# Patient Record
Sex: FEMALE | Race: WHITE | NOT HISPANIC OR LATINO
[De-identification: names, ages, dates, MRNs, and addresses within clinical notes are randomized per-mention and may not be internally consistent; named-entity substitution may affect disease eponyms.]

---

## 2022-07-05 ENCOUNTER — LABORATORY RESULT (OUTPATIENT)
Age: 32
End: 2022-07-05

## 2022-07-05 ENCOUNTER — RESULT CHARGE (OUTPATIENT)
Age: 32
End: 2022-07-05

## 2022-07-05 ENCOUNTER — APPOINTMENT (OUTPATIENT)
Dept: OBGYN | Facility: CLINIC | Age: 32
End: 2022-07-05

## 2022-07-05 ENCOUNTER — OUTPATIENT (OUTPATIENT)
Dept: OUTPATIENT SERVICES | Facility: HOSPITAL | Age: 32
LOS: 1 days | End: 2022-07-05
Payer: SELF-PAY

## 2022-07-05 VITALS — SYSTOLIC BLOOD PRESSURE: 110 MMHG | WEIGHT: 102 LBS | DIASTOLIC BLOOD PRESSURE: 60 MMHG

## 2022-07-05 DIAGNOSIS — N76.0 ACUTE VAGINITIS: ICD-10-CM

## 2022-07-05 DIAGNOSIS — Z82.49 FAMILY HISTORY OF ISCHEMIC HEART DISEASE AND OTHER DISEASES OF THE CIRCULATORY SYSTEM: ICD-10-CM

## 2022-07-05 LAB
ALBUMIN SERPL ELPH-MCNC: 4.4 G/DL — SIGNIFICANT CHANGE UP (ref 3.3–5)
ALP SERPL-CCNC: 49 U/L — SIGNIFICANT CHANGE UP (ref 40–120)
ALT FLD-CCNC: 6 U/L — LOW (ref 10–45)
ANION GAP SERPL CALC-SCNC: 12 MMOL/L — SIGNIFICANT CHANGE UP (ref 5–17)
AST SERPL-CCNC: 13 U/L — SIGNIFICANT CHANGE UP (ref 10–40)
BILIRUB SERPL-MCNC: 0.5 MG/DL — SIGNIFICANT CHANGE UP (ref 0.2–1.2)
BUN SERPL-MCNC: 8 MG/DL — SIGNIFICANT CHANGE UP (ref 7–23)
CALCIUM SERPL-MCNC: 9.5 MG/DL — SIGNIFICANT CHANGE UP (ref 8.4–10.5)
CHLORIDE SERPL-SCNC: 103 MMOL/L — SIGNIFICANT CHANGE UP (ref 96–108)
CO2 SERPL-SCNC: 22 MMOL/L — SIGNIFICANT CHANGE UP (ref 22–31)
CREAT SERPL-MCNC: 0.5 MG/DL — SIGNIFICANT CHANGE UP (ref 0.5–1.3)
EGFR: 126 ML/MIN/1.73M2 — SIGNIFICANT CHANGE UP
GLUCOSE SERPL-MCNC: 85 MG/DL — SIGNIFICANT CHANGE UP (ref 70–99)
HCG UR QL: POSITIVE
HCT VFR BLD CALC: 36.7 % — SIGNIFICANT CHANGE UP (ref 34.5–45)
HGB BLD-MCNC: 12.5 G/DL — SIGNIFICANT CHANGE UP (ref 11.5–15.5)
MCHC RBC-ENTMCNC: 31.6 PG — SIGNIFICANT CHANGE UP (ref 27–34)
MCHC RBC-ENTMCNC: 34.1 GM/DL — SIGNIFICANT CHANGE UP (ref 32–36)
MCV RBC AUTO: 92.7 FL — SIGNIFICANT CHANGE UP (ref 80–100)
PLATELET # BLD AUTO: 267 K/UL — SIGNIFICANT CHANGE UP (ref 150–400)
POTASSIUM SERPL-MCNC: 4.7 MMOL/L — SIGNIFICANT CHANGE UP (ref 3.5–5.3)
POTASSIUM SERPL-SCNC: 4.7 MMOL/L — SIGNIFICANT CHANGE UP (ref 3.5–5.3)
PROT SERPL-MCNC: 7.1 G/DL — SIGNIFICANT CHANGE UP (ref 6–8.3)
RBC # BLD: 3.96 M/UL — SIGNIFICANT CHANGE UP (ref 3.8–5.2)
RBC # FLD: 12.3 % — SIGNIFICANT CHANGE UP (ref 10.3–14.5)
SODIUM SERPL-SCNC: 137 MMOL/L — SIGNIFICANT CHANGE UP (ref 135–145)
TSH SERPL-MCNC: 0.51 UIU/ML — SIGNIFICANT CHANGE UP (ref 0.27–4.2)
WBC # BLD: 9.22 K/UL — SIGNIFICANT CHANGE UP (ref 3.8–10.5)
WBC # FLD AUTO: 9.22 K/UL — SIGNIFICANT CHANGE UP (ref 3.8–10.5)

## 2022-07-05 PROCEDURE — 85027 COMPLETE CBC AUTOMATED: CPT

## 2022-07-05 PROCEDURE — 81025 URINE PREGNANCY TEST: CPT

## 2022-07-05 PROCEDURE — 83020 HEMOGLOBIN ELECTROPHORESIS: CPT

## 2022-07-05 PROCEDURE — 81003 URINALYSIS AUTO W/O SCOPE: CPT

## 2022-07-05 PROCEDURE — 87591 N.GONORRHOEAE DNA AMP PROB: CPT

## 2022-07-05 PROCEDURE — 87086 URINE CULTURE/COLONY COUNT: CPT

## 2022-07-05 PROCEDURE — 87340 HEPATITIS B SURFACE AG IA: CPT

## 2022-07-05 PROCEDURE — 81329 SMN1 GENE DOS/DELETION ALYS: CPT

## 2022-07-05 PROCEDURE — 87491 CHLMYD TRACH DNA AMP PROBE: CPT

## 2022-07-05 PROCEDURE — 99204 OFFICE O/P NEW MOD 45 MIN: CPT

## 2022-07-05 PROCEDURE — 81243 FMR1 GEN ALY DETC ABNL ALLEL: CPT

## 2022-07-05 PROCEDURE — 80053 COMPREHEN METABOLIC PANEL: CPT

## 2022-07-05 PROCEDURE — 83036 HEMOGLOBIN GLYCOSYLATED A1C: CPT

## 2022-07-05 PROCEDURE — 87624 HPV HI-RISK TYP POOLED RSLT: CPT

## 2022-07-05 PROCEDURE — 87389 HIV-1 AG W/HIV-1&-2 AB AG IA: CPT

## 2022-07-05 PROCEDURE — 86762 RUBELLA ANTIBODY: CPT

## 2022-07-05 PROCEDURE — 86765 RUBEOLA ANTIBODY: CPT

## 2022-07-05 PROCEDURE — 83655 ASSAY OF LEAD: CPT

## 2022-07-05 PROCEDURE — 81220 CFTR GENE COM VARIANTS: CPT

## 2022-07-05 PROCEDURE — 84443 ASSAY THYROID STIM HORMONE: CPT

## 2022-07-05 PROCEDURE — 86850 RBC ANTIBODY SCREEN: CPT

## 2022-07-05 PROCEDURE — 86780 TREPONEMA PALLIDUM: CPT

## 2022-07-05 PROCEDURE — 36415 COLL VENOUS BLD VENIPUNCTURE: CPT

## 2022-07-05 PROCEDURE — 86900 BLOOD TYPING SEROLOGIC ABO: CPT

## 2022-07-05 PROCEDURE — 83020 HEMOGLOBIN ELECTROPHORESIS: CPT | Mod: 26

## 2022-07-05 PROCEDURE — G0463: CPT

## 2022-07-05 PROCEDURE — 87522 HEPATITIS C REVRS TRNSCRPJ: CPT

## 2022-07-05 PROCEDURE — 86901 BLOOD TYPING SEROLOGIC RH(D): CPT

## 2022-07-05 RX ORDER — PRENATAL VIT 49/IRON FUM/FOLIC 6.75-0.2MG
TABLET ORAL
Refills: 0 | Status: ACTIVE | COMMUNITY

## 2022-07-05 NOTE — DISCUSSION/SUMMARY
[FreeTextEntry1] : 33yo - new pt here fr confirmation of pregnancy- (9.1wks)- switched to PCAP\par \par - All PNL, PAP/GC/Ct/ Ucx drawn\par - bASA recommended\par - SW met with pt\par - [ ] ultrascreen @12wks - pt to have NIPS drawn same day (offered pt to come back >10 to have drawn as well)\par - SEE PRENATAL RECORD\par \par RTC 4 wks\par PTL precautions reviewed\par Jose Black, PAC

## 2022-07-05 NOTE — PHYSICAL EXAM
[Appropriately responsive] : appropriately responsive [Alert] : alert [No Acute Distress] : no acute distress [No Lymphadenopathy] : no lymphadenopathy [Regular Rate Rhythm] : regular rate rhythm [No Murmurs] : no murmurs [Clear to Auscultation B/L] : clear to auscultation bilaterally [Soft] : soft [Non-tender] : non-tender [Non-distended] : non-distended [No HSM] : No HSM [No Lesions] : no lesions [No Mass] : no mass [Oriented x3] : oriented x3 [Examination Of The Breasts] : a normal appearance [No Masses] : no breast masses were palpable [Labia Majora] : normal [Labia Minora] : normal [No Bleeding] : There was no active vaginal bleeding [Normal] : normal [Enlarged ___ wks] : enlarged [unfilled] ~Uweeks [Uterine Adnexae] : normal [Tenderness] : nontender

## 2022-07-05 NOTE — HISTORY OF PRESENT ILLNESS
[FreeTextEntry1] : 33yo  (LLMP 22) presents as new pt to confirm pregnancy.  Planned/ desired.  + pos home tests.  Denies vb/ bleeding.  Regular monthly menses q 28\par \par Obhx:   FTNSVD ([facility]) boy- 6lb 1oz- denies complications\par Gynhx: h/o LEEP (~)- nl paps since.  +CT    Last pap: unsure\par Pmhx: denies\par Shx: lap sherry , rhinoplasty\par Meds: PNV, vit D\par ALL: NKDA\par Sochx: denies tob/etoh/ drugs.  denies dv or abuse\par Famhx: parents- HTN/ HLD\par \par \par Covid - pfizer x2 (aware of booster recommendation)

## 2022-07-06 LAB
A1C WITH ESTIMATED AVERAGE GLUCOSE RESULT: 5.5 % — SIGNIFICANT CHANGE UP (ref 4–5.6)
C TRACH RRNA SPEC QL NAA+PROBE: SIGNIFICANT CHANGE UP
CULTURE RESULTS: SIGNIFICANT CHANGE UP
ESTIMATED AVERAGE GLUCOSE: 111 MG/DL — SIGNIFICANT CHANGE UP (ref 68–114)
HBV SURFACE AG SER-ACNC: SIGNIFICANT CHANGE UP
HCV RNA SPEC NAA+PROBE-LOG IU: SIGNIFICANT CHANGE UP
HCV RNA SPEC NAA+PROBE-LOG IU: SIGNIFICANT CHANGE UP LOGIU/ML
HEMOGLOBIN INTERPRETATION: SIGNIFICANT CHANGE UP
HGB A MFR BLD: 97.4 % — SIGNIFICANT CHANGE UP (ref 95.8–98)
HGB A2 MFR BLD: 2.6 % — SIGNIFICANT CHANGE UP (ref 2–3.2)
HIV 1+2 AB+HIV1 P24 AG SERPL QL IA: SIGNIFICANT CHANGE UP
HPV HIGH+LOW RISK DNA PNL CVX: SIGNIFICANT CHANGE UP
LEAD BLD-MCNC: <1 UG/DL — SIGNIFICANT CHANGE UP (ref 0–4)
MEV IGG SER-ACNC: <5 AU/ML — SIGNIFICANT CHANGE UP
MEV IGG+IGM SER-IMP: NEGATIVE — SIGNIFICANT CHANGE UP
N GONORRHOEA RRNA SPEC QL NAA+PROBE: SIGNIFICANT CHANGE UP
RUBV IGG SER-ACNC: 8.2 INDEX — SIGNIFICANT CHANGE UP
RUBV IGG SER-IMP: POSITIVE — SIGNIFICANT CHANGE UP
SPECIMEN SOURCE: SIGNIFICANT CHANGE UP
SPECIMEN SOURCE: SIGNIFICANT CHANGE UP
T PALLIDUM AB TITR SER: NEGATIVE — SIGNIFICANT CHANGE UP

## 2022-07-08 LAB — CYTOLOGY SPEC DOC CYTO: SIGNIFICANT CHANGE UP

## 2022-07-11 DIAGNOSIS — Z34.90 ENCOUNTER FOR SUPERVISION OF NORMAL PREGNANCY, UNSPECIFIED, UNSPECIFIED TRIMESTER: ICD-10-CM

## 2022-07-11 LAB — SMA INTERPRETATION: SIGNIFICANT CHANGE UP

## 2022-07-12 LAB — FRAGILE X PROTEIN (FMRP) PNL BLD: SIGNIFICANT CHANGE UP

## 2022-07-14 ENCOUNTER — NON-APPOINTMENT (OUTPATIENT)
Age: 32
End: 2022-07-14

## 2022-07-14 LAB — CYSTIC FIBROSIS EXPANDED PANEL: SIGNIFICANT CHANGE UP

## 2022-07-20 ENCOUNTER — APPOINTMENT (OUTPATIENT)
Dept: OBGYN | Facility: CLINIC | Age: 32
End: 2022-07-20

## 2022-07-20 ENCOUNTER — NON-APPOINTMENT (OUTPATIENT)
Age: 32
End: 2022-07-20

## 2022-07-20 ENCOUNTER — LABORATORY RESULT (OUTPATIENT)
Age: 32
End: 2022-07-20

## 2022-07-27 ENCOUNTER — APPOINTMENT (OUTPATIENT)
Dept: ANTEPARTUM | Facility: CLINIC | Age: 32
End: 2022-07-27

## 2022-07-27 ENCOUNTER — NON-APPOINTMENT (OUTPATIENT)
Age: 32
End: 2022-07-27

## 2022-07-27 ENCOUNTER — LABORATORY RESULT (OUTPATIENT)
Age: 32
End: 2022-07-27

## 2022-07-27 ENCOUNTER — ASOB RESULT (OUTPATIENT)
Age: 32
End: 2022-07-27

## 2022-07-27 PROCEDURE — 76801 OB US < 14 WKS SINGLE FETUS: CPT

## 2022-07-27 PROCEDURE — 76813 OB US NUCHAL MEAS 1 GEST: CPT | Mod: 59

## 2022-07-27 PROCEDURE — 36415 COLL VENOUS BLD VENIPUNCTURE: CPT

## 2022-08-07 ENCOUNTER — NON-APPOINTMENT (OUTPATIENT)
Age: 32
End: 2022-08-07

## 2022-08-08 ENCOUNTER — NON-APPOINTMENT (OUTPATIENT)
Age: 32
End: 2022-08-08

## 2022-08-08 DIAGNOSIS — U07.1 COVID-19: ICD-10-CM

## 2022-08-08 RX ORDER — NIRMATRELVIR AND RITONAVIR 300-100 MG
20 X 150 MG & KIT ORAL
Qty: 30 | Refills: 0 | Status: ACTIVE | COMMUNITY
Start: 2022-08-08 | End: 1900-01-01

## 2022-08-10 ENCOUNTER — NON-APPOINTMENT (OUTPATIENT)
Age: 32
End: 2022-08-10

## 2022-08-17 ENCOUNTER — NON-APPOINTMENT (OUTPATIENT)
Age: 32
End: 2022-08-17

## 2022-08-18 ENCOUNTER — TRANSCRIPTION ENCOUNTER (OUTPATIENT)
Age: 32
End: 2022-08-18

## 2022-08-18 ENCOUNTER — OUTPATIENT (OUTPATIENT)
Dept: OUTPATIENT SERVICES | Facility: HOSPITAL | Age: 32
LOS: 1 days | End: 2022-08-18
Payer: SELF-PAY

## 2022-08-18 ENCOUNTER — APPOINTMENT (OUTPATIENT)
Dept: OBGYN | Facility: CLINIC | Age: 32
End: 2022-08-18

## 2022-08-18 VITALS
DIASTOLIC BLOOD PRESSURE: 65 MMHG | SYSTOLIC BLOOD PRESSURE: 110 MMHG | BODY MASS INDEX: 22.04 KG/M2 | WEIGHT: 112.25 LBS | HEIGHT: 60 IN

## 2022-08-18 DIAGNOSIS — Z34.80 ENCOUNTER FOR SUPERVISION OF OTHER NORMAL PREGNANCY, UNSPECIFIED TRIMESTER: ICD-10-CM

## 2022-08-18 PROCEDURE — 99213 OFFICE O/P EST LOW 20 MIN: CPT | Mod: 25

## 2022-08-24 ENCOUNTER — ASOB RESULT (OUTPATIENT)
Age: 32
End: 2022-08-24

## 2022-08-24 ENCOUNTER — LABORATORY RESULT (OUTPATIENT)
Age: 32
End: 2022-08-24

## 2022-08-24 ENCOUNTER — APPOINTMENT (OUTPATIENT)
Dept: ANTEPARTUM | Facility: CLINIC | Age: 32
End: 2022-08-24

## 2022-08-24 DIAGNOSIS — Z34.90 ENCOUNTER FOR SUPERVISION OF NORMAL PREGNANCY, UNSPECIFIED, UNSPECIFIED TRIMESTER: ICD-10-CM

## 2022-08-24 DIAGNOSIS — Z34.92 ENCOUNTER FOR SUPERVISION OF NORMAL PREGNANCY, UNSPECIFIED, SECOND TRIMESTER: ICD-10-CM

## 2022-08-24 PROCEDURE — G0463: CPT

## 2022-08-24 PROCEDURE — 36415 COLL VENOUS BLD VENIPUNCTURE: CPT

## 2022-08-24 PROCEDURE — 81003 URINALYSIS AUTO W/O SCOPE: CPT

## 2022-08-24 PROCEDURE — 76805 OB US >/= 14 WKS SNGL FETUS: CPT

## 2022-09-07 ENCOUNTER — ASOB RESULT (OUTPATIENT)
Age: 32
End: 2022-09-07

## 2022-09-07 ENCOUNTER — NON-APPOINTMENT (OUTPATIENT)
Age: 32
End: 2022-09-07

## 2022-09-07 ENCOUNTER — APPOINTMENT (OUTPATIENT)
Dept: ANTEPARTUM | Facility: CLINIC | Age: 32
End: 2022-09-07

## 2022-09-07 PROCEDURE — 76817 TRANSVAGINAL US OBSTETRIC: CPT

## 2022-09-12 LAB — DEMOGRAPHIC DATA: SIGNIFICANT CHANGE UP

## 2022-09-19 ENCOUNTER — NON-APPOINTMENT (OUTPATIENT)
Age: 32
End: 2022-09-19

## 2022-09-21 ENCOUNTER — LABORATORY RESULT (OUTPATIENT)
Age: 32
End: 2022-09-21

## 2022-09-21 ENCOUNTER — OUTPATIENT (OUTPATIENT)
Dept: OUTPATIENT SERVICES | Facility: HOSPITAL | Age: 32
LOS: 1 days | End: 2022-09-21
Payer: SELF-PAY

## 2022-09-21 ENCOUNTER — APPOINTMENT (OUTPATIENT)
Dept: ANTEPARTUM | Facility: CLINIC | Age: 32
End: 2022-09-21

## 2022-09-21 ENCOUNTER — APPOINTMENT (OUTPATIENT)
Dept: OBGYN | Facility: CLINIC | Age: 32
End: 2022-09-21

## 2022-09-21 ENCOUNTER — ASOB RESULT (OUTPATIENT)
Age: 32
End: 2022-09-21

## 2022-09-21 VITALS — BODY MASS INDEX: 22.66 KG/M2 | SYSTOLIC BLOOD PRESSURE: 110 MMHG | DIASTOLIC BLOOD PRESSURE: 68 MMHG | WEIGHT: 116 LBS

## 2022-09-21 DIAGNOSIS — Z34.80 ENCOUNTER FOR SUPERVISION OF OTHER NORMAL PREGNANCY, UNSPECIFIED TRIMESTER: ICD-10-CM

## 2022-09-21 PROCEDURE — 99213 OFFICE O/P EST LOW 20 MIN: CPT | Mod: 25

## 2022-09-21 PROCEDURE — 76811 OB US DETAILED SNGL FETUS: CPT

## 2022-09-21 PROCEDURE — 76817 TRANSVAGINAL US OBSTETRIC: CPT

## 2022-09-21 PROCEDURE — G0463: CPT

## 2022-09-21 PROCEDURE — 86480 TB TEST CELL IMMUN MEASURE: CPT

## 2022-09-21 PROCEDURE — 81002 URINALYSIS NONAUTO W/O SCOPE: CPT

## 2022-09-21 PROCEDURE — 36415 COLL VENOUS BLD VENIPUNCTURE: CPT

## 2022-09-24 LAB
GAMMA INTERFERON BACKGROUND BLD IA-ACNC: 0.13 IU/ML — SIGNIFICANT CHANGE UP
M TB IFN-G BLD-IMP: NEGATIVE — SIGNIFICANT CHANGE UP
M TB IFN-G CD4+ BCKGRND COR BLD-ACNC: 0.06 IU/ML — SIGNIFICANT CHANGE UP
M TB IFN-G CD4+CD8+ BCKGRND COR BLD-ACNC: 0.04 IU/ML — SIGNIFICANT CHANGE UP
QUANT TB PLUS MITOGEN MINUS NIL: 8.05 IU/ML — SIGNIFICANT CHANGE UP

## 2022-09-26 DIAGNOSIS — Z34.92 ENCOUNTER FOR SUPERVISION OF NORMAL PREGNANCY, UNSPECIFIED, SECOND TRIMESTER: ICD-10-CM

## 2022-10-05 ENCOUNTER — ASOB RESULT (OUTPATIENT)
Age: 32
End: 2022-10-05

## 2022-10-05 ENCOUNTER — APPOINTMENT (OUTPATIENT)
Dept: ANTEPARTUM | Facility: CLINIC | Age: 32
End: 2022-10-05

## 2022-10-05 PROCEDURE — 76817 TRANSVAGINAL US OBSTETRIC: CPT

## 2022-10-05 PROCEDURE — 76816 OB US FOLLOW-UP PER FETUS: CPT

## 2022-10-06 ENCOUNTER — NON-APPOINTMENT (OUTPATIENT)
Age: 32
End: 2022-10-06

## 2022-10-19 ENCOUNTER — APPOINTMENT (OUTPATIENT)
Dept: OBGYN | Facility: CLINIC | Age: 32
End: 2022-10-19

## 2022-10-19 ENCOUNTER — LABORATORY RESULT (OUTPATIENT)
Age: 32
End: 2022-10-19

## 2022-10-19 ENCOUNTER — OUTPATIENT (OUTPATIENT)
Dept: OUTPATIENT SERVICES | Facility: HOSPITAL | Age: 32
LOS: 1 days | End: 2022-10-19
Payer: SELF-PAY

## 2022-10-19 VITALS — BODY MASS INDEX: 23.63 KG/M2 | SYSTOLIC BLOOD PRESSURE: 106 MMHG | WEIGHT: 121 LBS | DIASTOLIC BLOOD PRESSURE: 60 MMHG

## 2022-10-19 DIAGNOSIS — Z34.90 ENCOUNTER FOR SUPERVISION OF NORMAL PREGNANCY, UNSPECIFIED, UNSPECIFIED TRIMESTER: ICD-10-CM

## 2022-10-19 DIAGNOSIS — Z34.80 ENCOUNTER FOR SUPERVISION OF OTHER NORMAL PREGNANCY, UNSPECIFIED TRIMESTER: ICD-10-CM

## 2022-10-19 LAB — GLUCOSE 1H P MEAL SERPL-MCNC: 98 MG/DL — SIGNIFICANT CHANGE UP (ref 70–134)

## 2022-10-19 PROCEDURE — 36415 COLL VENOUS BLD VENIPUNCTURE: CPT

## 2022-10-19 PROCEDURE — 82950 GLUCOSE TEST: CPT

## 2022-10-19 PROCEDURE — 99213 OFFICE O/P EST LOW 20 MIN: CPT | Mod: 25

## 2022-11-14 ENCOUNTER — NON-APPOINTMENT (OUTPATIENT)
Age: 32
End: 2022-11-14

## 2022-11-15 ENCOUNTER — LABORATORY RESULT (OUTPATIENT)
Age: 32
End: 2022-11-15

## 2022-11-15 ENCOUNTER — OUTPATIENT (OUTPATIENT)
Dept: OUTPATIENT SERVICES | Facility: HOSPITAL | Age: 32
LOS: 1 days | End: 2022-11-15
Payer: MEDICAID

## 2022-11-15 ENCOUNTER — APPOINTMENT (OUTPATIENT)
Dept: OBGYN | Facility: CLINIC | Age: 32
End: 2022-11-15

## 2022-11-15 VITALS — BODY MASS INDEX: 24.22 KG/M2 | SYSTOLIC BLOOD PRESSURE: 110 MMHG | DIASTOLIC BLOOD PRESSURE: 76 MMHG | WEIGHT: 124 LBS

## 2022-11-15 DIAGNOSIS — Z34.80 ENCOUNTER FOR SUPERVISION OF OTHER NORMAL PREGNANCY, UNSPECIFIED TRIMESTER: ICD-10-CM

## 2022-11-15 LAB
HCT VFR BLD CALC: 39.5 % — SIGNIFICANT CHANGE UP (ref 34.5–45)
HGB BLD-MCNC: 12.9 G/DL — SIGNIFICANT CHANGE UP (ref 11.5–15.5)
MCHC RBC-ENTMCNC: 30.6 PG — SIGNIFICANT CHANGE UP (ref 27–34)
MCHC RBC-ENTMCNC: 32.7 GM/DL — SIGNIFICANT CHANGE UP (ref 32–36)
MCV RBC AUTO: 93.8 FL — SIGNIFICANT CHANGE UP (ref 80–100)
PLATELET # BLD AUTO: 211 K/UL — SIGNIFICANT CHANGE UP (ref 150–400)
RBC # BLD: 4.21 M/UL — SIGNIFICANT CHANGE UP (ref 3.8–5.2)
RBC # FLD: 13.2 % — SIGNIFICANT CHANGE UP (ref 10.3–14.5)
WBC # BLD: 8.93 K/UL — SIGNIFICANT CHANGE UP (ref 3.8–10.5)
WBC # FLD AUTO: 8.93 K/UL — SIGNIFICANT CHANGE UP (ref 3.8–10.5)

## 2022-11-15 PROCEDURE — 86780 TREPONEMA PALLIDUM: CPT

## 2022-11-15 PROCEDURE — G0463: CPT

## 2022-11-15 PROCEDURE — 81002 URINALYSIS NONAUTO W/O SCOPE: CPT

## 2022-11-15 PROCEDURE — 99213 OFFICE O/P EST LOW 20 MIN: CPT | Mod: 25

## 2022-11-15 PROCEDURE — 85027 COMPLETE CBC AUTOMATED: CPT

## 2022-11-15 PROCEDURE — 36415 COLL VENOUS BLD VENIPUNCTURE: CPT

## 2022-11-16 ENCOUNTER — ASOB RESULT (OUTPATIENT)
Age: 32
End: 2022-11-16

## 2022-11-16 ENCOUNTER — APPOINTMENT (OUTPATIENT)
Dept: ANTEPARTUM | Facility: CLINIC | Age: 32
End: 2022-11-16

## 2022-11-16 LAB — T PALLIDUM AB TITR SER: NEGATIVE — SIGNIFICANT CHANGE UP

## 2022-11-16 PROCEDURE — 76817 TRANSVAGINAL US OBSTETRIC: CPT

## 2022-11-16 PROCEDURE — 76816 OB US FOLLOW-UP PER FETUS: CPT

## 2022-11-19 DIAGNOSIS — Z34.90 ENCOUNTER FOR SUPERVISION OF NORMAL PREGNANCY, UNSPECIFIED, UNSPECIFIED TRIMESTER: ICD-10-CM

## 2022-11-28 RX ORDER — NIRMATRELVIR AND RITONAVIR 300-100 MG
20 X 150 MG & KIT ORAL
Qty: 30 | Refills: 0 | Status: ACTIVE | COMMUNITY
Start: 2022-11-28 | End: 1900-01-01

## 2022-12-04 ENCOUNTER — INPATIENT (INPATIENT)
Facility: HOSPITAL | Age: 32
LOS: 11 days | Discharge: HOME | End: 2022-12-16
Attending: OBSTETRICS & GYNECOLOGY | Admitting: OBSTETRICS & GYNECOLOGY
Payer: MEDICAID

## 2022-12-04 ENCOUNTER — NON-APPOINTMENT (OUTPATIENT)
Age: 32
End: 2022-12-04

## 2022-12-04 VITALS — OXYGEN SATURATION: 98 % | HEART RATE: 90 BPM

## 2022-12-04 DIAGNOSIS — Z98.890 OTHER SPECIFIED POSTPROCEDURAL STATES: Chronic | ICD-10-CM

## 2022-12-04 DIAGNOSIS — Z3A.00 WEEKS OF GESTATION OF PREGNANCY NOT SPECIFIED: ICD-10-CM

## 2022-12-04 DIAGNOSIS — O26.899 OTHER SPECIFIED PREGNANCY RELATED CONDITIONS, UNSPECIFIED TRIMESTER: ICD-10-CM

## 2022-12-04 DIAGNOSIS — Z34.80 ENCOUNTER FOR SUPERVISION OF OTHER NORMAL PREGNANCY, UNSPECIFIED TRIMESTER: ICD-10-CM

## 2022-12-04 DIAGNOSIS — Z90.49 ACQUIRED ABSENCE OF OTHER SPECIFIED PARTS OF DIGESTIVE TRACT: Chronic | ICD-10-CM

## 2022-12-04 LAB
ALBUMIN SERPL ELPH-MCNC: 3.9 G/DL — SIGNIFICANT CHANGE UP (ref 3.3–5)
ALP SERPL-CCNC: 120 U/L — SIGNIFICANT CHANGE UP (ref 40–120)
ALT FLD-CCNC: 15 U/L — SIGNIFICANT CHANGE UP (ref 10–45)
ANION GAP SERPL CALC-SCNC: 12 MMOL/L — SIGNIFICANT CHANGE UP (ref 5–17)
APPEARANCE UR: CLEAR — SIGNIFICANT CHANGE UP
APTT BLD: 24.8 SEC — LOW (ref 27.5–35.5)
AST SERPL-CCNC: 23 U/L — SIGNIFICANT CHANGE UP (ref 10–40)
BASOPHILS # BLD AUTO: 0.06 K/UL — SIGNIFICANT CHANGE UP (ref 0–0.2)
BASOPHILS NFR BLD AUTO: 0.8 % — SIGNIFICANT CHANGE UP (ref 0–2)
BILIRUB SERPL-MCNC: 0.3 MG/DL — SIGNIFICANT CHANGE UP (ref 0.2–1.2)
BILIRUB UR-MCNC: NEGATIVE — SIGNIFICANT CHANGE UP
BUN SERPL-MCNC: 9 MG/DL — SIGNIFICANT CHANGE UP (ref 7–23)
CALCIUM SERPL-MCNC: 9.2 MG/DL — SIGNIFICANT CHANGE UP (ref 8.4–10.5)
CHLORIDE SERPL-SCNC: 103 MMOL/L — SIGNIFICANT CHANGE UP (ref 96–108)
CO2 SERPL-SCNC: 23 MMOL/L — SIGNIFICANT CHANGE UP (ref 22–31)
COLOR SPEC: SIGNIFICANT CHANGE UP
COVID-19 SPIKE DOMAIN AB INTERP: POSITIVE
COVID-19 SPIKE DOMAIN ANTIBODY RESULT: >250 U/ML — HIGH
CREAT SERPL-MCNC: 0.54 MG/DL — SIGNIFICANT CHANGE UP (ref 0.5–1.3)
DIFF PNL FLD: NEGATIVE — SIGNIFICANT CHANGE UP
EGFR: 124 ML/MIN/1.73M2 — SIGNIFICANT CHANGE UP
EOSINOPHIL # BLD AUTO: 0.07 K/UL — SIGNIFICANT CHANGE UP (ref 0–0.5)
EOSINOPHIL NFR BLD AUTO: 0.9 % — SIGNIFICANT CHANGE UP (ref 0–6)
GLUCOSE SERPL-MCNC: 64 MG/DL — LOW (ref 70–99)
GLUCOSE UR QL: NEGATIVE — SIGNIFICANT CHANGE UP
HCT VFR BLD CALC: 39.5 % — SIGNIFICANT CHANGE UP (ref 34.5–45)
HGB BLD-MCNC: 13.7 G/DL — SIGNIFICANT CHANGE UP (ref 11.5–15.5)
IMM GRANULOCYTES NFR BLD AUTO: 0.7 % — SIGNIFICANT CHANGE UP (ref 0–0.9)
KETONES UR-MCNC: ABNORMAL
LEUKOCYTE ESTERASE UR-ACNC: NEGATIVE — SIGNIFICANT CHANGE UP
LYMPHOCYTES # BLD AUTO: 2.47 K/UL — SIGNIFICANT CHANGE UP (ref 1–3.3)
LYMPHOCYTES # BLD AUTO: 32.5 % — SIGNIFICANT CHANGE UP (ref 13–44)
MAGNESIUM SERPL-MCNC: 6 MG/DL — HIGH (ref 1.6–2.6)
MCHC RBC-ENTMCNC: 31.2 PG — SIGNIFICANT CHANGE UP (ref 27–34)
MCHC RBC-ENTMCNC: 34.7 GM/DL — SIGNIFICANT CHANGE UP (ref 32–36)
MCV RBC AUTO: 90 FL — SIGNIFICANT CHANGE UP (ref 80–100)
MONOCYTES # BLD AUTO: 0.42 K/UL — SIGNIFICANT CHANGE UP (ref 0–0.9)
MONOCYTES NFR BLD AUTO: 5.5 % — SIGNIFICANT CHANGE UP (ref 2–14)
NEUTROPHILS # BLD AUTO: 4.52 K/UL — SIGNIFICANT CHANGE UP (ref 1.8–7.4)
NEUTROPHILS NFR BLD AUTO: 59.6 % — SIGNIFICANT CHANGE UP (ref 43–77)
NITRITE UR-MCNC: NEGATIVE — SIGNIFICANT CHANGE UP
NRBC # BLD: 0 /100 WBCS — SIGNIFICANT CHANGE UP (ref 0–0)
PH UR: 6 — SIGNIFICANT CHANGE UP (ref 5–8)
PLATELET # BLD AUTO: 219 K/UL — SIGNIFICANT CHANGE UP (ref 150–400)
POTASSIUM SERPL-MCNC: 4.1 MMOL/L — SIGNIFICANT CHANGE UP (ref 3.5–5.3)
POTASSIUM SERPL-SCNC: 4.1 MMOL/L — SIGNIFICANT CHANGE UP (ref 3.5–5.3)
PROT SERPL-MCNC: 7.4 G/DL — SIGNIFICANT CHANGE UP (ref 6–8.3)
PROT UR-MCNC: SIGNIFICANT CHANGE UP
RBC # BLD: 4.39 M/UL — SIGNIFICANT CHANGE UP (ref 3.8–5.2)
RBC # FLD: 12 % — SIGNIFICANT CHANGE UP (ref 10.3–14.5)
SARS-COV-2 IGG+IGM SERPL QL IA: >250 U/ML — HIGH
SARS-COV-2 IGG+IGM SERPL QL IA: POSITIVE
SODIUM SERPL-SCNC: 138 MMOL/L — SIGNIFICANT CHANGE UP (ref 135–145)
SP GR SPEC: 1.02 — SIGNIFICANT CHANGE UP (ref 1.01–1.02)
URATE SERPL-MCNC: 6 MG/DL — SIGNIFICANT CHANGE UP (ref 2.5–7)
UROBILINOGEN FLD QL: NEGATIVE — SIGNIFICANT CHANGE UP
WBC # BLD: 7.59 K/UL — SIGNIFICANT CHANGE UP (ref 3.8–10.5)
WBC # FLD AUTO: 7.59 K/UL — SIGNIFICANT CHANGE UP (ref 3.8–10.5)

## 2022-12-04 RX ORDER — AMPICILLIN TRIHYDRATE 250 MG
2 CAPSULE ORAL ONCE
Refills: 0 | Status: COMPLETED | OUTPATIENT
Start: 2022-12-04 | End: 2022-12-04

## 2022-12-04 RX ORDER — FOLIC ACID 0.8 MG
1 TABLET ORAL DAILY
Refills: 0 | Status: DISCONTINUED | OUTPATIENT
Start: 2022-12-04 | End: 2022-12-05

## 2022-12-04 RX ORDER — AMPICILLIN TRIHYDRATE 250 MG
2 CAPSULE ORAL EVERY 6 HOURS
Refills: 0 | Status: DISCONTINUED | OUTPATIENT
Start: 2022-12-04 | End: 2022-12-05

## 2022-12-04 RX ORDER — SODIUM CHLORIDE 9 MG/ML
1000 INJECTION, SOLUTION INTRAVENOUS
Refills: 0 | Status: DISCONTINUED | OUTPATIENT
Start: 2022-12-04 | End: 2022-12-05

## 2022-12-04 RX ORDER — AMOXICILLIN 250 MG/5ML
500 SUSPENSION, RECONSTITUTED, ORAL (ML) ORAL EVERY 8 HOURS
Refills: 0 | Status: DISCONTINUED | OUTPATIENT
Start: 2022-12-04 | End: 2022-12-05

## 2022-12-04 RX ORDER — AZITHROMYCIN 500 MG/1
1000 TABLET, FILM COATED ORAL ONCE
Refills: 0 | Status: COMPLETED | OUTPATIENT
Start: 2022-12-04 | End: 2022-12-04

## 2022-12-04 RX ORDER — MAGNESIUM SULFATE 500 MG/ML
4 VIAL (ML) INJECTION ONCE
Refills: 0 | Status: COMPLETED | OUTPATIENT
Start: 2022-12-04 | End: 2022-12-04

## 2022-12-04 RX ORDER — MAGNESIUM SULFATE 500 MG/ML
2 VIAL (ML) INJECTION
Qty: 40 | Refills: 0 | Status: DISCONTINUED | OUTPATIENT
Start: 2022-12-04 | End: 2022-12-05

## 2022-12-04 RX ORDER — AMPICILLIN TRIHYDRATE 250 MG
CAPSULE ORAL
Refills: 0 | Status: DISCONTINUED | OUTPATIENT
Start: 2022-12-04 | End: 2022-12-05

## 2022-12-04 RX ADMIN — Medication 12 MILLIGRAM(S): at 11:39

## 2022-12-04 RX ADMIN — Medication 300 GRAM(S): at 11:44

## 2022-12-04 RX ADMIN — Medication 50 GM/HR: at 12:28

## 2022-12-04 RX ADMIN — Medication 100 GRAM(S): at 11:58

## 2022-12-04 RX ADMIN — Medication 100 GRAM(S): at 18:15

## 2022-12-04 RX ADMIN — AZITHROMYCIN 1000 MILLIGRAM(S): 500 TABLET, FILM COATED ORAL at 13:45

## 2022-12-04 NOTE — OB RN PATIENT PROFILE - FALL HARM RISK - UNIVERSAL INTERVENTIONS
Bed in lowest position, wheels locked, appropriate side rails in place/Call bell, personal items and telephone in reach/Instruct patient to call for assistance before getting out of bed or chair/Non-slip footwear when patient is out of bed/[place] to call system/Physically safe environment - no spills, clutter or unnecessary equipment/Purposeful Proactive Rounding/Room/bathroom lighting operational, light cord in reach

## 2022-12-04 NOTE — OB PROVIDER H&P - HISTORY OF PRESENT ILLNESS
R3 H&P    Pt is a 31 y/o  at 30w6d presents with leakage of fluid since 05:30AM. Patient states that she woke up and noted leakage of fluid and has had several episodes of leakage of fluid since. She denies any other complaints, no F/C, urinary symptoms, abdominal pain, nausea, vomiting. Of note, she states that she was sick with COVID on Thanksgiving.   Pt denies ctx, VB, FM felt.   Prenatal course uncomplicated (low lying placenta resolved)    OBHx: 2011 - , uncomplicated, 6#1  GynHx: +fibroids (R Lateral 3.1cm x 3.45cm x 3.29 cm, posterior 1.6 x 1.2 x 0.98 cm), hx of LEEP  PMHx: denies  PSHx: LSC sherry, rhinoplasty  Med: Asa, PNV  All: NKDA  SH: denies t/a/d  PsychHx: denies hx of anxiety/depression

## 2022-12-04 NOTE — OB PROVIDER H&P - ASSESSMENT
33 y/o  @30w6d presenting with PPROM@30w6d. PNC otherwise uncomplicated.     #PPROM  - Ampicillin for 48 hours & Azithromycin x1 for latency, Amoxicillin for 5 days following Ampicillin  - BMZ for FLM (-)  - Mg for fetal neuroprotection(-)  - Initial prolonged monitoring, may switch to NST BID after     #Fetal wellbeing  - BMZ/Monitoring as above  - NICU consult  - f/u GBS(-)    #Maternal wellbeing  - Regular diet  - HSQ/SCDs for DVT ppx  - PNV/Iron/Colace/Folic acid  - f/u UA    Yana Jade, PGY-3

## 2022-12-04 NOTE — OB PROVIDER H&P - ATTENDING COMMENTS
OB  Addendum    31 y/o  @30w6d  latency bmtz mg gtt  monitor on L&D till AM  NICU consult  Fetal status reassuring  vtx  admit to AP    Arley Kovacs MD   OB

## 2022-12-04 NOTE — OB RN PATIENT PROFILE - FUNCTIONAL ASSESSMENT - DAILY ACTIVITY 4.
4 = No assist / stand by assistance Island Pedicle Flap-Requiring Vessel Identification Text: The defect edges were debeveled with a #15 scalpel blade.  Given the location of the defect, shape of the defect and the proximity to free margins an island pedicle advancement flap was deemed most appropriate.  Using a sterile surgical marker, an appropriate advancement flap was drawn, based on the axial vessel mentioned above, incorporating the defect, outlining the appropriate donor tissue and placing the expected incisions within the relaxed skin tension lines where possible.    The area thus outlined was incised deep to adipose tissue with a #15 scalpel blade.  The skin margins were undermined to an appropriate distance in all directions around the primary defect and laterally outward around the island pedicle utilizing iris scissors.  There was minimal undermining beneath the pedicle flap.

## 2022-12-04 NOTE — OB PROVIDER H&P - ALERT: PERTINENT HISTORY
Message   Recorded as Task   Date: 05/19/2017 03:52 PM, Created By: Britta Larson   Task Name: 4. Patient Message   Assigned To: SAMARA PULIDO   Regarding Patient: KARLA FREEMAN, Status: Active   Comment:    Britta Larson - 19 May 2017 3:52 PM     TASK CREATED  Patient wanted you to know that the birth control she takes is called STEFFANIE        Signatures   Electronically signed by : SAMARA PULIDO M.D.; May 21 2017 12:11PM CST    
1st Trimester Sonogram/20 Week Level II Sonogram

## 2022-12-04 NOTE — OB PROVIDER H&P - NSHPPHYSICALEXAM_GEN_ALL_CORE
VS  T(C): 36.6 (12-04-22 @ 09:36)  HR: 69 (12-04-22 @ 11:54)  BP: 121/79 (12-04-22 @ 09:36)  RR: 18 (12-04-22 @ 09:36)  SpO2: 100% (12-04-22 @ 11:54)  GA: NAD  Cards: RRR  Pulm: CTAB  EFH: baseline 150,moderate variability, +accels, -decels   [place]: rare ctx  VE: 1/0/-3  TAUS: cephalic, posterior placenta, KYLEE 5.2cm, +fetal tone, +fetal mvt, +practice breathing VS  T(C): 36.6 (12-04-22 @ 09:36)  HR: 69 (12-04-22 @ 11:54)  BP: 121/79 (12-04-22 @ 09:36)  RR: 18 (12-04-22 @ 09:36)  SpO2: 100% (12-04-22 @ 11:54)  GA: NAD  Cards: RRR  Pulm: CTAB  EFH: baseline 150,moderate variability, +accels, -decels   [place]: rare ctx    SSE: +pooling  VE: 1/0/-3  TAUS: cephalic, posterior placenta, KYLEE 5.2cm, +fetal tone, +fetal mvt, +practice breathing

## 2022-12-05 ENCOUNTER — APPOINTMENT (OUTPATIENT)
Dept: OBGYN | Facility: CLINIC | Age: 32
End: 2022-12-05

## 2022-12-05 ENCOUNTER — APPOINTMENT (OUTPATIENT)
Dept: ANTEPARTUM | Facility: CLINIC | Age: 32
End: 2022-12-05

## 2022-12-05 ENCOUNTER — ASOB RESULT (OUTPATIENT)
Age: 32
End: 2022-12-05

## 2022-12-05 LAB
CREAT ?TM UR-MCNC: 85 MG/DL — SIGNIFICANT CHANGE UP
GROUP B BETA STREP DNA (PCR): SIGNIFICANT CHANGE UP
MAGNESIUM SERPL-MCNC: 6.5 MG/DL — HIGH (ref 1.6–2.6)
PROT ?TM UR-MCNC: 14 MG/DL — HIGH (ref 0–12)
PROT/CREAT UR-RTO: 0.2 RATIO — SIGNIFICANT CHANGE UP (ref 0–0.2)
SARS-COV-2 RNA SPEC QL NAA+PROBE: SIGNIFICANT CHANGE UP
SOURCE GROUP B STREP: SIGNIFICANT CHANGE UP
T PALLIDUM AB TITR SER: NEGATIVE — SIGNIFICANT CHANGE UP

## 2022-12-05 PROCEDURE — 99253 IP/OBS CNSLTJ NEW/EST LOW 45: CPT

## 2022-12-05 RX ORDER — AMPICILLIN TRIHYDRATE 250 MG
2 CAPSULE ORAL EVERY 6 HOURS
Refills: 0 | Status: COMPLETED | OUTPATIENT
Start: 2022-12-05 | End: 2022-12-06

## 2022-12-05 RX ORDER — FOLIC ACID 0.8 MG
1 TABLET ORAL DAILY
Refills: 0 | Status: DISCONTINUED | OUTPATIENT
Start: 2022-12-05 | End: 2022-12-12

## 2022-12-05 RX ORDER — HEPARIN SODIUM 5000 [USP'U]/ML
5000 INJECTION INTRAVENOUS; SUBCUTANEOUS EVERY 12 HOURS
Refills: 0 | Status: DISCONTINUED | OUTPATIENT
Start: 2022-12-05 | End: 2022-12-12

## 2022-12-05 RX ORDER — AMOXICILLIN 250 MG/5ML
500 SUSPENSION, RECONSTITUTED, ORAL (ML) ORAL EVERY 8 HOURS
Refills: 0 | Status: COMPLETED | OUTPATIENT
Start: 2022-12-05

## 2022-12-05 RX ADMIN — Medication 12 MILLIGRAM(S): at 11:57

## 2022-12-05 RX ADMIN — Medication 100 GRAM(S): at 11:58

## 2022-12-05 RX ADMIN — HEPARIN SODIUM 5000 UNIT(S): 5000 INJECTION INTRAVENOUS; SUBCUTANEOUS at 22:18

## 2022-12-05 RX ADMIN — Medication 100 GRAM(S): at 00:11

## 2022-12-05 RX ADMIN — Medication 100 GRAM(S): at 05:20

## 2022-12-05 RX ADMIN — Medication 100 GRAM(S): at 18:32

## 2022-12-05 RX ADMIN — Medication 1 TABLET(S): at 11:58

## 2022-12-05 RX ADMIN — HEPARIN SODIUM 5000 UNIT(S): 5000 INJECTION INTRAVENOUS; SUBCUTANEOUS at 11:56

## 2022-12-05 RX ADMIN — Medication 1 MILLIGRAM(S): at 11:57

## 2022-12-05 NOTE — PROGRESS NOTE ADULT - SUBJECTIVE AND OBJECTIVE BOX
R3 Antepartum Note, HD# 2    Patient seen and examined at bedside, no acute overnight events. No acute complaints. Pt reports +FM, denies LOF, VB, ctx, HA, epigastric pain, blurred vision, CP, SOB, N/V, fevers, and chills.    Vital Signs Last 24 Hours  T(C): 36.6 (12-05-22 @ 06:22), Max: 37.0 (12-04-22 @ 22:15)  HR: 79 (12-05-22 @ 06:22) (62 - 121)  BP: 108/64 (12-05-22 @ 06:22) (108/64 - 143/84)  RR: 18 (12-05-22 @ 06:22) (18 - 19)  SpO2: 97% (12-05-22 @ 06:22) (82% - 100%)    CAPILLARY BLOOD GLUCOSE          Physical Exam:  General: NAD  Abdomen: Soft, non-tender, gravid  Ext: No pain or swelling    NST reactive overnight    Labs:             13.7   7.59  )-----------( 219      ( 12-04 @ 12:28 )             39.5     12-04 @ 12:28    138  |  103  |  9   ----------------------------<  64  4.1   |  23  |  0.54    Ca    9.2      12-04 @ 12:28  Mg     6.5     12-04 @ 23:55    TPro  7.4  /  Alb  3.9  /  TBili  0.3  /  DBili  x   /  AST  23  /  ALT  15  /  AlkPhos  120  12-04 @ 12:28      PTT - ( 12-04 @ 12:28 )  PTT:24.8 sec    Uric Acid: (12-04 @ 12:28)  6.0      Fibrinogen: (12-04 @ 12:28)  --       LDH: (12-04 @ 12:28)  --         MEDICATIONS  (STANDING):  amoxicillin 500 milliGRAM(s) Oral every 8 hours  ampicillin  IVPB 2 Gram(s) IV Intermittent every 6 hours  betamethasone Injectable 12 milliGRAM(s) IntraMuscular every 24 hours  folic acid 1 milliGRAM(s) Oral daily  heparin   Injectable 5000 Unit(s) SubCutaneous every 12 hours  prenatal multivitamin 1 Tablet(s) Oral daily    MEDICATIONS  (PRN):

## 2022-12-05 NOTE — PROGRESS NOTE ADULT - ASSESSMENT
31 y/o  @31w presenting with PPROM@30w6d. Maternal and Fetal status reassuring at this time.     #PPROM  - Ampicillin for 48 hours & Azithromycin x1 for latency, Amoxicillin for 5 days following Ampicillin  - BMZ for FLM (-)  - s/p Mg for fetal neuroprotection  -  NST BID     #gHTN  -Monitor blood pressures   -HELLP labs wnl   -F/u P/C ratio      #Fetal wellbeing  - BMZ/Monitoring as above  - NICU consult  - f/u GBS(-)    #Maternal wellbeing  - Regular diet  - HSQ/SCDs for DVT ppx  - PNV  -COVID pending     Consider ATU sono today     Poonam Feliciano, PGY3   33 y/o  @31w presenting with PPROM@30w6d. Maternal and Fetal status reassuring at this time.     #PPROM  - Ampicillin for 48 hours & Azithromycin x1 for latency, Amoxicillin for 5 days following Ampicillin  - BMZ for FLM (-)  - s/p Mg for fetal neuroprotection  -  NST BID     #gHTN  -Monitor blood pressures   -HELLP labs wnl   -F/u P/C ratio      #Fetal wellbeing  - BMZ/Monitoring as above  - NICU consult  - f/u GBS(-)    #Maternal wellbeing  - Regular diet  - HSQ/SCDs for DVT ppx  - PNV  -COVID pending     Consider ATU sono today     Poonam Feliciano, PGY3    -------------    MFM Fellow Attestation    The patient is a 33 yo  at 31 weeks admitted overnight for PPROM occurring yesterday. She ruled in based on leaking since 5:30am yesterday and was found to have positive pooling on exam per report (exam not documented in H&P). Patient started on betamethasone course (first dose ) and received 12 hours magnesium for neuroprotection as well as latency antibiotics. She is afebrile with no fundal tenderness, no leukocytosis and no signs of  labor. UA negative on admission, GBS is pending. Her obstetrical history is remarkable for one term . Fetus is vertex, most recent growth  1120g (20%ile). NICU consulted on admission Will continue 7 day course of latency antibiotics and collect gonorrhea/chlamydia/trichomonas labs for completion. Plan for observation inpatient until 34-36w6d with twice daily NST and weekly BPP, with immediate delivery for intraamniotic infection, placental abruption, fetal distress.     Raz Phillips PGY-5   To be seen and d/w Dr. Looney    31 y/o  @31w presenting with PPROM@30w6d. Maternal and Fetal status reassuring at this time.     #PPROM  - Ampicillin for 48 hours & Azithromycin x1 for latency, Amoxicillin for 5 days following Ampicillin  - BMZ for FLM (-)  - s/p Mg for fetal neuroprotection  -  NST BID     #gHTN  -Monitor blood pressures   -HELLP labs wnl   -F/u P/C ratio      #Fetal wellbeing  - BMZ/Monitoring as above  - NICU consult  - f/u GBS(-)    #Maternal wellbeing  - Regular diet  - HSQ/SCDs for DVT ppx  - PNV  -COVID pending     Consider ATU sono today     Poonam Feliciano, PGY3    -------------    MFM Fellow Attestation    The patient is a 31 yo  at 31 weeks admitted overnight for PPROM occurring yesterday. She ruled in based on leaking since 5:30am yesterday and was found to have positive pooling on exam per report (exam not documented in H&P). Patient started on betamethasone course (first dose ) and received 12 hours magnesium for neuroprotection as well as latency antibiotics. She is afebrile with no fundal tenderness, no leukocytosis and no signs of  labor. UA negative on admission, GBS is pending. Her obstetrical history is remarkable for one term . Fetus is vertex, most recent growth  1120g (20%ile). NICU consulted on admission. Patient also met criteria for gestational hypertension based on mild range blood pressures 4 hours apart, blood pressures now normotensive with negative P:C ratio. Will continue 7 day course of latency antibiotics and collect gonorrhea/chlamydia/trichomonas labs for completion. Plan for observation inpatient until 34-36w6d with twice daily NST and weekly BPP, with immediate delivery for intraamniotic infection, placental abruption, fetal distress.     Raz Phillips PGY-5   To be seen and d/w Dr. Looney    31 y/o  @31w presenting with PPROM@30w6d. Maternal and Fetal status reassuring at this time.     #PPROM  - Ampicillin for 48 hours & Azithromycin x1 for latency, Amoxicillin for 5 days following Ampicillin  - BMZ for FLM (-)  - s/p Mg for fetal neuroprotection  -  NST BID     #gHTN  -Monitor blood pressures   -HELLP labs wnl   -F/u P/C ratio      #Fetal wellbeing  - BMZ/Monitoring as above  - NICU consult  - f/u GBS(-)    #Maternal wellbeing  - Regular diet  - HSQ/SCDs for DVT ppx  - PNV  -COVID pending     Consider ATU sono today     Poonam Feliciano, PGY3    -------------    MFM Fellow Attestation    The patient is a 31 yo  at 31 weeks admitted overnight for PPROM occurring yesterday. She ruled in based on clear leaking since 5:30am yesterday and was found to have positive pooling on exam per report (exam not documented in H&P). Patient started on betamethasone course (first dose ) and received 12 hours magnesium for neuroprotection as well as latency antibiotics. She is afebrile with no fundal tenderness, no leukocytosis and no signs of  labor. UA negative on admission, GBS is pending. Her obstetrical history is remarkable for one term . Fetus is vertex, most recent growth  1120g (20%ile). NICU consulted on admission. Patient also met criteria for gestational hypertension based on mild range blood pressures 4 hours apart, blood pressures now normotensive with negative P:C ratio and no symptoms of preeclampsia. Will continue 7 day course of latency antibiotics and collect gonorrhea/chlamydia/trichomonas labs for completion. Plan for observation inpatient until 34-36w6d with twice daily NST and weekly BPP, with immediate delivery for intraamniotic infection, placental abruption, fetal distress.     Raz Phillips PGY-5   Seen and d/w Dr. Looney

## 2022-12-06 ENCOUNTER — NON-APPOINTMENT (OUTPATIENT)
Age: 32
End: 2022-12-06

## 2022-12-06 LAB
C TRACH RRNA SPEC QL NAA+PROBE: SIGNIFICANT CHANGE UP
N GONORRHOEA RRNA SPEC QL NAA+PROBE: SIGNIFICANT CHANGE UP

## 2022-12-06 PROCEDURE — 99231 SBSQ HOSP IP/OBS SF/LOW 25: CPT

## 2022-12-06 RX ORDER — TETANUS TOXOID, REDUCED DIPHTHERIA TOXOID AND ACELLULAR PERTUSSIS VACCINE, ADSORBED 5; 2.5; 8; 8; 2.5 [IU]/.5ML; [IU]/.5ML; UG/.5ML; UG/.5ML; UG/.5ML
0.5 SUSPENSION INTRAMUSCULAR ONCE
Refills: 0 | Status: COMPLETED | OUTPATIENT
Start: 2022-12-06 | End: 2022-12-06

## 2022-12-06 RX ORDER — AMOXICILLIN 250 MG/5ML
500 SUSPENSION, RECONSTITUTED, ORAL (ML) ORAL EVERY 8 HOURS
Refills: 0 | Status: COMPLETED | OUTPATIENT
Start: 2022-12-06 | End: 2022-12-10

## 2022-12-06 RX ORDER — ASPIRIN/CALCIUM CARB/MAGNESIUM 324 MG
162 TABLET ORAL DAILY
Refills: 0 | Status: DISCONTINUED | OUTPATIENT
Start: 2022-12-06 | End: 2022-12-12

## 2022-12-06 RX ADMIN — HEPARIN SODIUM 5000 UNIT(S): 5000 INJECTION INTRAVENOUS; SUBCUTANEOUS at 21:20

## 2022-12-06 RX ADMIN — Medication 162 MILLIGRAM(S): at 12:20

## 2022-12-06 RX ADMIN — TETANUS TOXOID, REDUCED DIPHTHERIA TOXOID AND ACELLULAR PERTUSSIS VACCINE, ADSORBED 0.5 MILLILITER(S): 5; 2.5; 8; 8; 2.5 SUSPENSION INTRAMUSCULAR at 22:41

## 2022-12-06 RX ADMIN — HEPARIN SODIUM 5000 UNIT(S): 5000 INJECTION INTRAVENOUS; SUBCUTANEOUS at 09:06

## 2022-12-06 RX ADMIN — Medication 100 GRAM(S): at 00:01

## 2022-12-06 RX ADMIN — Medication 500 MILLIGRAM(S): at 21:16

## 2022-12-06 RX ADMIN — Medication 1 MILLIGRAM(S): at 12:20

## 2022-12-06 RX ADMIN — Medication 1 TABLET(S): at 12:20

## 2022-12-06 RX ADMIN — Medication 500 MILLIGRAM(S): at 05:33

## 2022-12-06 RX ADMIN — Medication 500 MILLIGRAM(S): at 13:34

## 2022-12-06 NOTE — CONSULT NOTE PEDS - SUBJECTIVE AND OBJECTIVE BOX
Ms. Elkins is a 33y/o  admitted at 31week gestational age. No significant maternal history. Prenatal history complicated by a low lying placenta which has since resolved and premature rupture of membranes (). Most recent EFW 1120g on . NICU consulted to discuss what to expect if she were to deliver at 31 weeks gestation.    I met with Ms. Elkins and we reviewed the followin. The NICU team will be present at her delivery and will immediately assess and care for her infant.    2. Due to prematurity, the infant may require respiratory support, most commonly in the form of CPAP. The outcomes improve after  steroids. Less commonly, some infants at this gestational age will require intubation and mechanical ventilation with surfactant administration.    3. Depending on the clinical status of the infant, enteral feedings may not be started immediately. IV nutrition in the form of TPN would be provided via umbilical line or PICC until the infant is able to tolerate full enteral feedings. Due to immature suck/swallow, she may require an orogastric tube once feeds are initiated. She is also at risk for hypoglycemia.    4. Discussed the benefits of breastfeeding in  infants and encouraged mother to pump following delivery.    5. Due to prematurity, the infant will be at increased risk for infection and would likely be started on antibiotics after birth. The infant will be screened for infections following delivery and may require other courses of antibiotics during their hospital course if an infection were suspected. If the infant shows signs and symptoms of feeding intolerance, feedings may be held, and the infant may require evaluation for intestinal infection (necrotizing enterocolitis).    6. Need for possible blood transfusions, jaundice, phototherapy discussed.    7. Small risk of IVH discussed.    8. Small risk of symptomatic PDA discussed with possible need for medical vs. transcatheter closure.    9. ROP risk discussed along with close follow up with ophthalmologist.    10. The infant is at risk for developmental delays as a consequence of prematurity. The infant will be evaluated by a developmental pediatrician to monitor for neurodevelopmental delays.    11. Thermoregulation issues and need to be in an isolette with slow weaning to a crib discussed.    12. Length of stay is highly variable, but given the infant’s size and gestational age, average stay is approximately 5-6 weeks. Discussed discharged criteria.    Ms. Elkins had the chance to ask any questions and was encouraged to contact the NICU at that time if additional questions arise.    Thank you for the opportunity to participate in the care of this patient and please inform us of any changes in her status.

## 2022-12-06 NOTE — PROGRESS NOTE ADULT - ASSESSMENT
31 y/o  @31w presenting with PPROM@30w6d. Maternal and Fetal status reassuring at this time.     #PPROM  - Ampicillin for 48 hours & Azithromycin x1 for latency, Amoxicillin for 5 days following Ampicillin  - BMZ for FLM (-)  - s/p Mg for fetal neuroprotection  -  NST BID   -F/u Urine GC/CT    #gHTN  -Monitor blood pressures   -HELLP labs wnl , P/C:0.2     #Fetal wellbeing  - BMZ/Monitoring as above  - NICU consult  - f/u GBS(-)    #Maternal wellbeing  - Regular diet  - HSQ/SCDs for DVT ppx  - PNV      Poonam Feliciano, PGY3   31 y/o  @31w presenting with PPROM@30w6d. Maternal and Fetal status reassuring at this time.     #PPROM  - Ampicillin for 48 hours & Azithromycin x1 for latency, Amoxicillin for 5 days following Ampicillin  - BMZ for FLM (-)  - s/p Mg for fetal neuroprotection  -  NST BID   -F/u Urine GC/CT    #gHTN  -Monitor blood pressures   -HELLP labs wnl , P/C:0.2     #Fetal wellbeing  - BMZ/Monitoring as above  - NICU consult  - f/u GBS(-)    #Maternal wellbeing  - Regular diet  - HSQ/SCDs for DVT ppx  - PNV    Poonam Feliciano, PGY3    ------------    MFM Fellow Attestation    The patient is a 31 yo  at 31w1d (by stated ADOLFO 23) admitted for PPROM at 29w6d. Patient received betamethasone for fetal lung maturity (-) and is s/p magnesium for neuroprotection on admission. She is on day 3 of latency antibiotics (now transitioning to amoxicillin). She is afebrile with no fundal tenderness, no leukocytosis and no signs of  labor. UA negative on admission, GBS and gonorrhea/chlamydia/trichomonas still pending. Her obstetrical history is remarkable for one term . Fetus is vertex, most recent growth  1120g (20%ile). NICU consulted on admission. Patient also met criteria for gestational hypertension based on mild range blood pressures 4 hours apart, blood pressures now normotensive with negative P:C ratio on admission and no symptoms of preeclampsia. Will continue 7 day course of latency antibiotics. Plan for observation inpatient until 34-36w6d with twice daily NST and weekly BPP, with immediate delivery for intraamniotic infection, placental abruption, fetal distress.     Raz Phillips PGY-5   To be seen and d/w Dr. Looney      33 y/o  @31w presenting with PPROM@30w6d. Maternal and Fetal status reassuring at this time.     #PPROM  - Ampicillin for 48 hours & Azithromycin x1 for latency, Amoxicillin for 5 days following Ampicillin  - BMZ for FLM (-)  - s/p Mg for fetal neuroprotection  -  NST BID   -F/u Urine GC/CT    #gHTN  -Monitor blood pressures   -HELLP labs wnl , P/C:0.2     #Fetal wellbeing  - BMZ/Monitoring as above  - NICU consult  - f/u GBS(-)    #Maternal wellbeing  - Regular diet  - HSQ/SCDs for DVT ppx  - PNV    Poonam Feliciano, PGY3    ------------    MFM Fellow Attestation    The patient is a 31 yo  at 31w1d (by stated ADOLFO 23) admitted for PPROM at 29w6d. Patient received betamethasone for fetal lung maturity (-) and is s/p magnesium for neuroprotection on admission. She is on day 3 of latency antibiotics (now transitioning to amoxicillin). She is afebrile with no fundal tenderness, no leukocytosis and no signs of  labor. UA negative on admission, GBS negative. Gonorrhea/chlamydia/trichomonas results pending. Her obstetrical history is remarkable for one term . Fetus is vertex, most recent growth  1120g (20%ile). NICU consulted on admission. Patient also met criteria for gestational hypertension based on mild range blood pressures 4 hours apart, blood pressures now normotensive with negative P:C ratio on admission and no symptoms of preeclampsia. Will continue 7 day course of latency antibiotics. Plan for observation inpatient until 34-36w6d with twice daily NST and weekly BPP, with immediate delivery for intraamniotic infection, placental abruption, fetal distress.     Raz Phillips PGY-5   To be seen and d/w Dr. Looney      31 y/o  @31w presenting with PPROM@30w6d. Maternal and Fetal status reassuring at this time.     #PPROM  - Ampicillin for 48 hours & Azithromycin x1 for latency, Amoxicillin for 5 days following Ampicillin  - BMZ for FLM (-)  - s/p Mg for fetal neuroprotection  -  NST BID   -F/u Urine GC/CT    #gHTN  -Monitor blood pressures   -HELLP labs wnl , P/C:0.2     #Fetal wellbeing  - BMZ/Monitoring as above  - NICU consult  - f/u GBS(-)    #Maternal wellbeing  - Regular diet  - HSQ/SCDs for DVT ppx  - PNV    Poonam Feliciano, PGY3    ------------    M Fellow Attestation    The patient is a 33 yo  at 31w1d (by stated ADOLFO 23) admitted for PPROM at 29w6d. Patient received betamethasone for fetal lung maturity (-) and is s/p magnesium for neuroprotection on admission. She is on day 3 of latency antibiotics (now transitioning to amoxicillin). She is afebrile with no fundal tenderness, no leukocytosis and no signs of  labor. UA negative on admission, GBS negative. Gonorrhea/chlamydia/trichomonas results pending. Her obstetrical history is remarkable for one term . Fetus remains vertex with sufficient amniotic fluid, most recent growth  1120g (20%ile), with reactive NSTs this admission. NICU consulted on admission but have not yet seen patient, will call them back to perform consult. Patient also met criteria for gestational hypertension based on mild range blood pressures 4 hours apart, blood pressures now normotensive with negative P:C ratio on admission and no symptoms of preeclampsia. Will continue 7 day course of latency antibiotics. Prenatal records reviewed, patient did not receive Tdap in prenatal care (at low risk clinic), will administer today. Plan for observation inpatient until 34-36w6d with twice daily NST and weekly BPP, with immediate delivery for intraamniotic infection, placental abruption, fetal distress.     Raz Phillips, PAMELLA Fellow   Seen and d/w Dr. Looney

## 2022-12-06 NOTE — PROGRESS NOTE ADULT - SUBJECTIVE AND OBJECTIVE BOX
R3 Antepartum Note,    Patient seen and examined at bedside, no acute overnight events. No acute complaints. Pt reports +FM, denies LOF, VB, ctx, HA, epigastric pain, blurred vision, CP, SOB, N/V, fevers, and chills.    Vital Signs Last 24 Hours  T(C): 36.4 (12-06-22 @ 05:40), Max: 36.9 (12-05-22 @ 16:54)  HR: 56 (12-06-22 @ 05:40) (56 - 75)  BP: 110/71 (12-06-22 @ 05:40) (110/71 - 121/77)  RR: 16 (12-06-22 @ 05:40) (16 - 18)  SpO2: 97% (12-06-22 @ 05:40) (96% - 98%)    CAPILLARY BLOOD GLUCOSE          Physical Exam:  General: NAD  Abdomen: Soft, non-tender, gravid  Ext: No pain or swelling    NST reactive overnight    Labs:             13.7   7.59  )-----------( 219      ( 12-04 @ 12:28 )             39.5     12-04 @ 12:28    138  |  103  |  9   ----------------------------<  64  4.1   |  23  |  0.54    Ca    9.2      12-04 @ 12:28  Mg     6.5     12-04 @ 23:55    TPro  7.4  /  Alb  3.9  /  TBili  0.3  /  DBili  x   /  AST  23  /  ALT  15  /  AlkPhos  120  12-04 @ 12:28      PTT - ( 12-04 @ 12:28 )  PTT:24.8 sec    Uric Acid: (12-04 @ 12:28)  6.0      Fibrinogen: (12-04 @ 12:28)  --       LDH: (12-04 @ 12:28)  --         MEDICATIONS  (STANDING):  amoxicillin 500 milliGRAM(s) Oral every 8 hours  aspirin enteric coated 162 milliGRAM(s) Oral daily  folic acid 1 milliGRAM(s) Oral daily  heparin   Injectable 5000 Unit(s) SubCutaneous every 12 hours  prenatal multivitamin 1 Tablet(s) Oral daily    MEDICATIONS  (PRN):

## 2022-12-07 ENCOUNTER — NON-APPOINTMENT (OUTPATIENT)
Age: 32
End: 2022-12-07

## 2022-12-07 LAB
BLD GP AB SCN SERPL QL: NEGATIVE — SIGNIFICANT CHANGE UP
RH IG SCN BLD-IMP: POSITIVE — SIGNIFICANT CHANGE UP

## 2022-12-07 PROCEDURE — 99231 SBSQ HOSP IP/OBS SF/LOW 25: CPT

## 2022-12-07 RX ADMIN — Medication 500 MILLIGRAM(S): at 14:14

## 2022-12-07 RX ADMIN — HEPARIN SODIUM 5000 UNIT(S): 5000 INJECTION INTRAVENOUS; SUBCUTANEOUS at 21:31

## 2022-12-07 RX ADMIN — Medication 500 MILLIGRAM(S): at 05:10

## 2022-12-07 RX ADMIN — Medication 162 MILLIGRAM(S): at 12:42

## 2022-12-07 RX ADMIN — Medication 1 MILLIGRAM(S): at 12:42

## 2022-12-07 RX ADMIN — Medication 500 MILLIGRAM(S): at 21:31

## 2022-12-07 RX ADMIN — HEPARIN SODIUM 5000 UNIT(S): 5000 INJECTION INTRAVENOUS; SUBCUTANEOUS at 08:38

## 2022-12-07 RX ADMIN — Medication 1 TABLET(S): at 12:42

## 2022-12-07 NOTE — PROGRESS NOTE ADULT - ASSESSMENT
33 y/o  @31w 1d presenting with PPROM@30w6d. Maternal and Fetal status reassuring at this time.     #PPROM  - Ampicillin for 48 hours & Azithromycin x1 for latency, Amoxicillin for 5 days following Ampicillin  - BMZ for FLM (-)  - s/p Mg for fetal neuroprotection  -  NST BID   -Urine GC/CT negative    #gHTN  -Monitor blood pressures   -HELLP labs wnl , P/C:0.2     #Fetal wellbeing  - BMZ/Monitoring as above  - NICU consult  - f/u GBS(-)  -s/p Tdap    #Maternal wellbeing  - Regular diet  - HSQ/SCDs for DVT ppx  - PNV    Poonam Feliciano, PGY3   31 y/o  @31w 1d presenting with PPROM@30w6d. Maternal and Fetal status reassuring at this time.     #PPROM  - Ampicillin for 48 hours & Azithromycin x1 for latency, Amoxicillin for 5 days following Ampicillin  - BMZ for FLM (-)  - s/p Mg for fetal neuroprotection  -  NST BID   -Urine GC/CT negative    #gHTN  -Monitor blood pressures   -HELLP labs wnl , P/C:0.2     #Fetal wellbeing  - BMZ/Monitoring as above  - NICU consult  - f/u GBS(-)  -s/p Tdap    #Maternal wellbeing  - Regular diet  - HSQ/SCDs for DVT ppx  - PNV    Poonam Feliciano, PGY3    ----------    M Fellow Attestation    The patient is a 31 yo  at 31w2d admitted for PPROM at 29w6d. S/p betamethasone for fetal lung maturity (-) and magnesium for neuroprotection on admission. She is on day 4 of latency antibiotics. No signs of intra-amniotic infection or  labor. UA, gonorrhea/chlamydia, GBS negative. Her obstetrical history is remarkable for one term . Fetus remains vertex, AGA on most recent growth  1120g (20%ile), with reactive NSTs this admission. She is s/p NICU consult. Additional prenatal issues include gestational hypertension, predominantly normotensive. Will continue 7 day course of latency antibiotics, with plan for continued observation inpatient until 34-36w6d with twice daily NST and weekly BPP, with immediate delivery for intraamniotic infection, placental abruption, fetal distress. Patient undecided on contraception; methods briefly reviewed; discussed that if development of intra-amniotic infection will have to have interval placement 12 weeks postpartum.     Raz Phillips, PAMELLA Fellow   Seen and d/w Dr. Looney

## 2022-12-07 NOTE — PROGRESS NOTE ADULT - SUBJECTIVE AND OBJECTIVE BOX
R3 Antepartum Note, HD# 4    Patient seen and examined at bedside, no acute overnight events. No acute complaints. Pt reports +FM, denies LOF, VB, ctx, HA, epigastric pain, blurred vision, CP, SOB, N/V, fevers, and chills.    Vital Signs Last 24 Hours  T(C): 36.5 (12-07-22 @ 05:24), Max: 36.8 (12-06-22 @ 09:10)  HR: 51 (12-07-22 @ 05:24) (50 - 65)  BP: 125/79 (12-07-22 @ 05:24) (110/64 - 135/82)  RR: 18 (12-07-22 @ 05:24) (17 - 18)  SpO2: 97% (12-07-22 @ 05:24) (97% - 99%)    CAPILLARY BLOOD GLUCOSE          Physical Exam:  General: NAD  Abdomen: Soft, non-tender, gravid  Ext: No pain or swelling    NST reactive overnight    Labs:          PTT - ( 12-04 @ 12:28 )  PTT:24.8 sec    Uric Acid: (12-04 @ 12:28)  6.0      Fibrinogen: (12-04 @ 12:28)  --       LDH: (12-04 @ 12:28)  --         MEDICATIONS  (STANDING):  amoxicillin 500 milliGRAM(s) Oral every 8 hours  aspirin enteric coated 162 milliGRAM(s) Oral daily  folic acid 1 milliGRAM(s) Oral daily  heparin   Injectable 5000 Unit(s) SubCutaneous every 12 hours  prenatal multivitamin 1 Tablet(s) Oral daily    MEDICATIONS  (PRN):

## 2022-12-08 PROCEDURE — 93010 ELECTROCARDIOGRAM REPORT: CPT

## 2022-12-08 PROCEDURE — 99231 SBSQ HOSP IP/OBS SF/LOW 25: CPT

## 2022-12-08 RX ADMIN — Medication 500 MILLIGRAM(S): at 14:05

## 2022-12-08 RX ADMIN — Medication 1 MILLIGRAM(S): at 12:00

## 2022-12-08 RX ADMIN — Medication 500 MILLIGRAM(S): at 21:12

## 2022-12-08 RX ADMIN — HEPARIN SODIUM 5000 UNIT(S): 5000 INJECTION INTRAVENOUS; SUBCUTANEOUS at 21:12

## 2022-12-08 RX ADMIN — Medication 162 MILLIGRAM(S): at 12:00

## 2022-12-08 RX ADMIN — Medication 1 TABLET(S): at 12:01

## 2022-12-08 RX ADMIN — Medication 500 MILLIGRAM(S): at 06:24

## 2022-12-08 RX ADMIN — HEPARIN SODIUM 5000 UNIT(S): 5000 INJECTION INTRAVENOUS; SUBCUTANEOUS at 09:04

## 2022-12-08 NOTE — PROGRESS NOTE ADULT - ASSESSMENT
33 y/o  @31w 2d presenting with PPROM@30w6d. Maternal and Fetal status reassuring at this time.     #PPROM  - Ampicillin for 48 hours & Azithromycin x1 for latency, Amoxicillin for 5 days following Ampicillin  - BMZ for FLM (-)  - s/p Mg for fetal neuroprotection  -  NST BID   -Urine GC/CT negative    #gHTN  -Monitor blood pressures   -HELLP labs wnl , P/C:0.2     #Fetal wellbeing  - BMZ/Monitoring as above  - NICU consult  - f/u GBS neg  -s/p Tdap    #Maternal wellbeing  - Regular diet  - HSQ/SCDs for DVT ppx  - PNV    Poonam Feliciano, PGY3   31 y/o  @31w 2d presenting with PPROM@30w6d. Maternal and Fetal status reassuring at this time.     #PPROM  - Ampicillin for 48 hours & Azithromycin x1 for latency, Amoxicillin for 5 days following Ampicillin  - BMZ for FLM (-)  - s/p Mg for fetal neuroprotection  -  NST BID   -Urine GC/CT negative    #gHTN  -Monitor blood pressures   -HELLP labs wnl , P/C:0.2     #Fetal wellbeing  - BMZ/Monitoring as above  - NICU consult  - f/u GBS neg  -s/p Tdap    #Maternal wellbeing  - Regular diet  - HSQ/SCDs for DVT ppx  - PNV    Poonam Feliciano, PGY3    -----------    JONATHANM Fellow Attestation    The patient is a 31 yo  at 31w3d admitted for PPROM at 29w6d. Received betamethasone for fetal lung maturity (-) and magnesium for neuroprotection on admission. She is on day 5 of latency antibiotics. No signs of intra-amniotic infection or  labor. Infectious workup negative on admission, GBS negative. Prior term . Fetus remains vertex, AGA as of  1120g (20%ile), with reactive NSTs this admission. S/p NICU consult. Additional prenatal issues include gestational hypertension, predominantly normotensive. Plan to continue 7 day course of latency antibiotics, with plan for continued observation inpatient until 34-36w6d with twice daily NST and twice weekly BPPs, with immediate delivery for intraamniotic infection, placental abruption, fetal distress. Patient remains undecided on contraception; Bedsider information given; discussed that if development of intra-amniotic infection will have to have interval placement 12 weeks postpartum. Patient bradycardic overnight (HR 50-76), asymptomatic, will obtain EKG.     Raz Phillips, PAMELLA Fellow   Seen and d/w Dr. Looney

## 2022-12-08 NOTE — PROGRESS NOTE ADULT - SUBJECTIVE AND OBJECTIVE BOX
R3 Antepartum Note,     Patient seen and examined at bedside, no acute overnight events. No acute complaints. Pt reports +FM, denies LOF, VB, ctx, HA, epigastric pain, blurred vision, CP, SOB, N/V, fevers, and chills.    Vital Signs Last 24 Hours  T(C): 36.6 (12-08-22 @ 06:26), Max: 37 (12-07-22 @ 23:54)  HR: 59 (12-08-22 @ 06:26) (50 - 76)  BP: 129/77 (12-08-22 @ 06:26) (109/69 - 129/83)  RR: 19 (12-08-22 @ 06:26) (18 - 19)  SpO2: 95% (12-08-22 @ 06:26) (95% - 98%)    CAPILLARY BLOOD GLUCOSE          Physical Exam:  General: NAD  Abdomen: Soft, non-tender, gravid  Ext: No pain or swelling    NST reactive overnight    Labs:                MEDICATIONS  (STANDING):  amoxicillin 500 milliGRAM(s) Oral every 8 hours  aspirin enteric coated 162 milliGRAM(s) Oral daily  folic acid 1 milliGRAM(s) Oral daily  heparin   Injectable 5000 Unit(s) SubCutaneous every 12 hours  prenatal multivitamin 1 Tablet(s) Oral daily    MEDICATIONS  (PRN):

## 2022-12-09 ENCOUNTER — ASOB RESULT (OUTPATIENT)
Age: 32
End: 2022-12-09

## 2022-12-09 ENCOUNTER — APPOINTMENT (OUTPATIENT)
Dept: ANTEPARTUM | Facility: CLINIC | Age: 32
End: 2022-12-09

## 2022-12-09 PROCEDURE — 99231 SBSQ HOSP IP/OBS SF/LOW 25: CPT

## 2022-12-09 RX ADMIN — Medication 500 MILLIGRAM(S): at 13:38

## 2022-12-09 RX ADMIN — Medication 1 MILLIGRAM(S): at 12:22

## 2022-12-09 RX ADMIN — Medication 500 MILLIGRAM(S): at 22:57

## 2022-12-09 RX ADMIN — HEPARIN SODIUM 5000 UNIT(S): 5000 INJECTION INTRAVENOUS; SUBCUTANEOUS at 21:03

## 2022-12-09 RX ADMIN — Medication 162 MILLIGRAM(S): at 12:22

## 2022-12-09 RX ADMIN — Medication 1 TABLET(S): at 12:22

## 2022-12-09 RX ADMIN — HEPARIN SODIUM 5000 UNIT(S): 5000 INJECTION INTRAVENOUS; SUBCUTANEOUS at 09:01

## 2022-12-09 RX ADMIN — Medication 500 MILLIGRAM(S): at 05:18

## 2022-12-09 NOTE — PROGRESS NOTE ADULT - ASSESSMENT
33 y/o  @31w 3d presenting with PPROM@30w6d. Maternal and Fetal status reassuring at this time.     #PPROM  - Ampicillin for 48 hours & Azithromycin x1 for latency, Amoxicillin for 5 days following Ampicillin  - BMZ for FLM (-)  - s/p Mg for fetal neuroprotection  -  NST BID   -Urine GC/CT negative    #gHTN  -Monitor blood pressures   -HELLP labs wnl , P/C:0.2     #Fetal wellbeing  - BMZ/Monitoring as above  - NICU consult  - f/u GBS neg  -s/p Tdap    #Maternal wellbeing  - Regular diet  - HSQ/SCDs for DVT ppx  - PNV    oPonam Feliciano, PGY3   33 y/o  @31w 3d presenting with PPROM@30w6d. Maternal and Fetal status reassuring at this time.     #PPROM  - Ampicillin for 48 hours & Azithromycin x1 for latency, Amoxicillin for 5 days following Ampicillin  - BMZ for FLM (-)  - s/p Mg for fetal neuroprotection  -  NST BID   -Urine GC/CT negative    #gHTN  -Monitor blood pressures, patient noted to have mild range BPs overnight    -HELLP labs wnl , P/C:0.2     #Fetal wellbeing  - BMZ/Monitoring as above  - NICU consult  - GBS neg  -s/p Tdap    #Maternal wellbeing  - Regular diet  - HSQ/SCDs for DVT ppx  - PNV     Patient with short term disability paperwork to be completed     Poonam Feliciano, PGY3   31 y/o  @31w 3d presenting with PPROM@30w6d. Maternal and Fetal status reassuring at this time.     #PPROM  - Ampicillin for 48 hours & Azithromycin x1 for latency, Amoxicillin for 5 days following Ampicillin  - BMZ for FLM (-)  - s/p Mg for fetal neuroprotection  -  NST BID   -Urine GC/CT negative    #gHTN  -Monitor blood pressures, patient noted to have mild range BPs overnight    -HELLP labs wnl , P/C:0.2     #Fetal wellbeing  - BMZ/Monitoring as above  - NICU consult  - GBS neg  -s/p Tdap    #Maternal wellbeing  - Regular diet  - HSQ/SCDs for DVT ppx  - PNV     Patient with short term disability paperwork to be completed     Poonam Feliciano, PGY3    --------    ARNOLD Fellow Attestation    The patient is a 33 yo  at 31w4d admitted for PPROM at 29w6d. S/p betamethasone -, magnesium (), currently day 6/7 of latency antibiotics. Continues to have no signs of intra-amniotic infection or  labor. Infectious workup negative on admission, GBS negative. Prior term . AGA on last growth  1120g (20%ile), with reactive NSTs this admission; plan for BPP today. S/p NICU consult. Additional prenatal issues include gestational hypertension, normal to mild range blood pressures, patient without symptoms of preeclampsia but will perform twice weekly preeclampsia labs. Plan to continue 7 day course of latency antibiotics, with plan for continued observation inpatient until 34-36w6d with twice daily NST and twice weekly BPPs, with immediate delivery for intraamniotic infection, placental abruption, fetal distress. Undecided on contraception previously counseled. EKG performed for intermittent maternal bradycardia (HR 50s) resulted normal sinus rhythm.     Raz Phillips, ARNOLD Fellow   Seen and d/w Dr. Looney

## 2022-12-09 NOTE — PROGRESS NOTE ADULT - SUBJECTIVE AND OBJECTIVE BOX
R3 Antepartum Note    Patient seen and examined at bedside, no acute overnight events. No acute complaints. Pt reports +FM, denies LOF, VB, ctx, HA, epigastric pain, blurred vision, CP, SOB, N/V, fevers, and chills.    Vital Signs Last 24 Hours  T(C): 36.9 (12-09-22 @ 05:31), Max: 36.9 (12-09-22 @ 05:31)  HR: 52 (12-09-22 @ 05:31) (52 - 62)  BP: 146/81 (12-09-22 @ 05:31) (115/76 - 146/81)  RR: 18 (12-09-22 @ 05:31) (18 - 19)  SpO2: 97% (12-09-22 @ 05:31) (95% - 100%)    CAPILLARY BLOOD GLUCOSE          Physical Exam:  General: NAD  Abdomen: Soft, non-tender, gravid  Ext: No pain or swelling    NST reactive overnight    Labs:                MEDICATIONS  (STANDING):  amoxicillin 500 milliGRAM(s) Oral every 8 hours  aspirin enteric coated 162 milliGRAM(s) Oral daily  folic acid 1 milliGRAM(s) Oral daily  heparin   Injectable 5000 Unit(s) SubCutaneous every 12 hours  prenatal multivitamin 1 Tablet(s) Oral daily    MEDICATIONS  (PRN):

## 2022-12-10 LAB
ALBUMIN SERPL ELPH-MCNC: 3 G/DL — LOW (ref 3.3–5)
ALP SERPL-CCNC: 100 U/L — SIGNIFICANT CHANGE UP (ref 40–120)
ALT FLD-CCNC: 19 U/L — SIGNIFICANT CHANGE UP (ref 10–45)
ANION GAP SERPL CALC-SCNC: 10 MMOL/L — SIGNIFICANT CHANGE UP (ref 5–17)
APTT BLD: 29 SEC — SIGNIFICANT CHANGE UP (ref 27.5–35.5)
AST SERPL-CCNC: 19 U/L — SIGNIFICANT CHANGE UP (ref 10–40)
BASOPHILS # BLD AUTO: 0.07 K/UL — SIGNIFICANT CHANGE UP (ref 0–0.2)
BASOPHILS NFR BLD AUTO: 0.6 % — SIGNIFICANT CHANGE UP (ref 0–2)
BILIRUB SERPL-MCNC: 0.2 MG/DL — SIGNIFICANT CHANGE UP (ref 0.2–1.2)
BLD GP AB SCN SERPL QL: NEGATIVE — SIGNIFICANT CHANGE UP
BUN SERPL-MCNC: 9 MG/DL — SIGNIFICANT CHANGE UP (ref 7–23)
CALCIUM SERPL-MCNC: 8.5 MG/DL — SIGNIFICANT CHANGE UP (ref 8.4–10.5)
CHLORIDE SERPL-SCNC: 106 MMOL/L — SIGNIFICANT CHANGE UP (ref 96–108)
CO2 SERPL-SCNC: 20 MMOL/L — LOW (ref 22–31)
CREAT SERPL-MCNC: 0.51 MG/DL — SIGNIFICANT CHANGE UP (ref 0.5–1.3)
EGFR: 126 ML/MIN/1.73M2 — SIGNIFICANT CHANGE UP
EOSINOPHIL # BLD AUTO: 0.26 K/UL — SIGNIFICANT CHANGE UP (ref 0–0.5)
EOSINOPHIL NFR BLD AUTO: 2.2 % — SIGNIFICANT CHANGE UP (ref 0–6)
FIBRINOGEN PPP-MCNC: 403 MG/DL — SIGNIFICANT CHANGE UP (ref 200–445)
GLUCOSE SERPL-MCNC: 85 MG/DL — SIGNIFICANT CHANGE UP (ref 70–99)
HCT VFR BLD CALC: 35.1 % — SIGNIFICANT CHANGE UP (ref 34.5–45)
HGB BLD-MCNC: 12.1 G/DL — SIGNIFICANT CHANGE UP (ref 11.5–15.5)
IMM GRANULOCYTES NFR BLD AUTO: 2 % — HIGH (ref 0–0.9)
INR BLD: 0.9 RATIO — SIGNIFICANT CHANGE UP (ref 0.88–1.16)
LDH SERPL L TO P-CCNC: 170 U/L — SIGNIFICANT CHANGE UP (ref 50–242)
LYMPHOCYTES # BLD AUTO: 27.7 % — SIGNIFICANT CHANGE UP (ref 13–44)
LYMPHOCYTES # BLD AUTO: 3.25 K/UL — SIGNIFICANT CHANGE UP (ref 1–3.3)
MCHC RBC-ENTMCNC: 31.1 PG — SIGNIFICANT CHANGE UP (ref 27–34)
MCHC RBC-ENTMCNC: 34.5 GM/DL — SIGNIFICANT CHANGE UP (ref 32–36)
MCV RBC AUTO: 90.2 FL — SIGNIFICANT CHANGE UP (ref 80–100)
MONOCYTES # BLD AUTO: 0.74 K/UL — SIGNIFICANT CHANGE UP (ref 0–0.9)
MONOCYTES NFR BLD AUTO: 6.3 % — SIGNIFICANT CHANGE UP (ref 2–14)
NEUTROPHILS # BLD AUTO: 7.16 K/UL — SIGNIFICANT CHANGE UP (ref 1.8–7.4)
NEUTROPHILS NFR BLD AUTO: 61.2 % — SIGNIFICANT CHANGE UP (ref 43–77)
NRBC # BLD: 0 /100 WBCS — SIGNIFICANT CHANGE UP (ref 0–0)
PLATELET # BLD AUTO: 218 K/UL — SIGNIFICANT CHANGE UP (ref 150–400)
POTASSIUM SERPL-MCNC: 4 MMOL/L — SIGNIFICANT CHANGE UP (ref 3.5–5.3)
POTASSIUM SERPL-SCNC: 4 MMOL/L — SIGNIFICANT CHANGE UP (ref 3.5–5.3)
PROT SERPL-MCNC: 6 G/DL — SIGNIFICANT CHANGE UP (ref 6–8.3)
PROTHROM AB SERPL-ACNC: 10.4 SEC — LOW (ref 10.5–13.4)
RBC # BLD: 3.89 M/UL — SIGNIFICANT CHANGE UP (ref 3.8–5.2)
RBC # FLD: 12.3 % — SIGNIFICANT CHANGE UP (ref 10.3–14.5)
RH IG SCN BLD-IMP: POSITIVE — SIGNIFICANT CHANGE UP
SODIUM SERPL-SCNC: 136 MMOL/L — SIGNIFICANT CHANGE UP (ref 135–145)
URATE SERPL-MCNC: 5.9 MG/DL — SIGNIFICANT CHANGE UP (ref 2.5–7)
WBC # BLD: 11.72 K/UL — HIGH (ref 3.8–10.5)
WBC # FLD AUTO: 11.72 K/UL — HIGH (ref 3.8–10.5)

## 2022-12-10 PROCEDURE — 99231 SBSQ HOSP IP/OBS SF/LOW 25: CPT

## 2022-12-10 RX ADMIN — Medication 500 MILLIGRAM(S): at 21:43

## 2022-12-10 RX ADMIN — Medication 1 MILLIGRAM(S): at 12:07

## 2022-12-10 RX ADMIN — Medication 162 MILLIGRAM(S): at 12:07

## 2022-12-10 RX ADMIN — Medication 1 TABLET(S): at 12:07

## 2022-12-10 RX ADMIN — Medication 500 MILLIGRAM(S): at 15:37

## 2022-12-10 RX ADMIN — Medication 500 MILLIGRAM(S): at 05:49

## 2022-12-10 RX ADMIN — HEPARIN SODIUM 5000 UNIT(S): 5000 INJECTION INTRAVENOUS; SUBCUTANEOUS at 20:50

## 2022-12-10 RX ADMIN — HEPARIN SODIUM 5000 UNIT(S): 5000 INJECTION INTRAVENOUS; SUBCUTANEOUS at 09:04

## 2022-12-10 NOTE — PROGRESS NOTE ADULT - NSPROGADDITIONALINFOA_GEN_ALL_CORE
M Fellow Addendum:    Briefly, patient is a 33 YO  at 31w5d with PPROM, GHTN, and FGR with elevated umbilical artery dopplers. Patient reports two episodes of pink spotting when voiding. She reports continued leakage of clear fluid. She denies fevers/chills, cramping/contractions, leakage of purulent or foul smelling discharge. She reports normal fetal movement. She also denies headache, visual changes, chest pain, shortness of breath, nausea/vomiting, RUQ/epigastric pain. Patient is afebrile, non-tachycardic and with normal to mild range blood pressures (113-140/73-90). On abdominal exam, no fundal tenderness. WBC 11.72 today, which represents an increase (7.6 on 22) but is still within normal limits for the third trimester of pregnancy. Cr (0.51) and transaminases (19/19 within normal limits). Low concern for place. PM NST reactive (baseline 140, moderate variability, positive accelerations, no decelerations. tocometry acontractile). Low concern for intraamniotic infection,  labor or placental abruption. Low concern for worsening hypertensive disorder of pregnancy. Patient is s/p betamethasone -, magnesium (), currently day 7/ of latency antibiotics. GBS negative. S/p NICU consult. Continue twice daily NST, next BPP/dopplers 12/10/22. Continue routine blood pressure monitoring, symptom monitoring and q3d T&S/PEC labs.    Val Souza MD  [facility] Fellow  Attg: Dr. Looney

## 2022-12-10 NOTE — PROGRESS NOTE ADULT - ASSESSMENT
33 y/o  @31w 4d presenting with PPROM@30w6d. Maternal and Fetal status reassuring at this time.     #PPROM  - Ampicillin for 48 hours & Azithromycin x1 for latency, Amoxicillin for 5 days following Ampicillin  - BMZ for FLM (-)  - s/p Mg for fetal neuroprotection  - NST BID   -Urine GC/CT negative    #gHTN  -Monitor blood pressures, patient noted to have mild range BPs overnight    -HELLP labs wnl , P/C:0.2     #Fetal wellbeing  - BMZ/Monitoring as above  - NICU consult  - GBS neg  -s/p Tdap    #Maternal wellbeing  - Regular diet  - HSQ/SCDs for DVT ppx  - PNV     Patient with short term disability paperwork to be completed    Tati Trimble MD PGY3

## 2022-12-10 NOTE — PROGRESS NOTE ADULT - SUBJECTIVE AND OBJECTIVE BOX
R3 Antepartum Note, HD#7    Interval events:    Patient seen and examined at bedside, no acute overnight events. No acute complaints. Pt reports +FM, denies LOF, VB, ctx, HA, epigastric pain, blurred vision, CP, SOB, N/V, fevers, and chills.    Vital Signs Last 24 Hours  T(C): 36.9 (12-10-22 @ 00:05), Max: 36.9 (12-09-22 @ 05:31)  HR: 73 (12-10-22 @ 00:05) (52 - 90)  BP: 128/83 (12-10-22 @ 00:05) (113/73 - 146/81)  RR: 18 (12-10-22 @ 00:05) (17 - 18)  SpO2: 97% (12-10-22 @ 00:05) (95% - 97%)    CAPILLARY BLOOD GLUCOSE          Physical Exam:  General: NAD  Abdomen: Soft, non-tender, gravid  Ext: No pain or swelling    NST reactive overnight    Labs:                MEDICATIONS  (STANDING):  amoxicillin 500 milliGRAM(s) Oral every 8 hours  aspirin enteric coated 162 milliGRAM(s) Oral daily  folic acid 1 milliGRAM(s) Oral daily  heparin   Injectable 5000 Unit(s) SubCutaneous every 12 hours  prenatal multivitamin 1 Tablet(s) Oral daily    MEDICATIONS  (PRN):

## 2022-12-11 ENCOUNTER — APPOINTMENT (OUTPATIENT)
Dept: ANTEPARTUM | Facility: CLINIC | Age: 32
End: 2022-12-11

## 2022-12-11 PROCEDURE — 99231 SBSQ HOSP IP/OBS SF/LOW 25: CPT

## 2022-12-11 RX ADMIN — HEPARIN SODIUM 5000 UNIT(S): 5000 INJECTION INTRAVENOUS; SUBCUTANEOUS at 09:20

## 2022-12-11 RX ADMIN — Medication 162 MILLIGRAM(S): at 12:17

## 2022-12-11 RX ADMIN — Medication 1 TABLET(S): at 12:17

## 2022-12-11 RX ADMIN — Medication 1 MILLIGRAM(S): at 12:16

## 2022-12-11 RX ADMIN — HEPARIN SODIUM 5000 UNIT(S): 5000 INJECTION INTRAVENOUS; SUBCUTANEOUS at 21:03

## 2022-12-11 NOTE — PROGRESS NOTE ADULT - ASSESSMENT
33 y/o  @31w 5d presenting with PPROM@30w6d. Maternal and Fetal status reassuring at this time.     #PPROM  - s/p latency antibiotics(10/6-10/10)   - BMZ for FLM (-)  - s/p Mg for fetal neuroprotection  - NST BID   -Urine GC/CT negative    #gHTN  -Monitor blood pressures, patient noted to have mild range BPs overnight    -HELLP labs wnl , P/C:0.2     #Fetal wellbeing  - BMZ/Monitoring as above  - NICU consult  - GBS neg  - s/p Tdap    #Maternal wellbeing  - Regular diet  - HSQ/SCDs for DVT ppx  - PNV     Jessica PGY3 33 y/o  @31w 5d presenting with PPROM@30w6d. Maternal and fetal status reassuring at this time.     #PPROM  - s/p latency antibiotics(10/6-10/10)   - BMZ for FLM (-)  - s/p Mg for fetal neuroprotection  - NST BID   -Urine GC/CT negative    #gHTN  -Monitor blood pressures, normotensive overnight  -HELLP labs wnl , P/C:0.2  -HELLP labs q3d     #Fetal wellbeing  -newly diagnosed FGR(2%) on  with elevated, but not absent UA Dopplers  - BMZ/Monitoring as above  - s/p NICU consult  - GBS neg  - s/p Tdap    #Maternal wellbeing  - Regular diet  - HSQ/SCDs for DVT ppx  - PNV     Jessica PGY3

## 2022-12-11 NOTE — PROGRESS NOTE ADULT - SUBJECTIVE AND OBJECTIVE BOX
R3 Antepartum Note, HD#8    Patient seen and examined at bedside, no acute overnight events. No acute complaints. Denies leakage of fluid. Pt reports +FM, denies VB, ctx, HA, epigastric pain, blurred vision, CP, SOB, N/V, fevers, and chills.    Vital Signs Last 24 Hours  T(C): 36.4 (12-11-22 @ 00:23), Max: 36.9 (12-10-22 @ 08:51)  HR: 76 (12-11-22 @ 00:23) (68 - 97)  BP: 116/74 (12-11-22 @ 00:23) (116/74 - 142/90)  RR: 17 (12-11-22 @ 00:23) (17 - 18)  SpO2: 98% (12-11-22 @ 00:23) (95% - 98%)    CAPILLARY BLOOD GLUCOSE          Physical Exam:  General: NAD  Abdomen: Soft, non-tender, gravid  Ext: No pain or swelling    NST reactive overnight    Labs:             12.1   11.72 )-----------( 218      ( 12-10 @ 06:26 )             35.1     12-10 @ 06:27    136  |  106  |  9   ----------------------------<  85  4.0   |  20  |  0.51    Ca    8.5      12-10 @ 06:27    TPro  6.0  /  Alb  3.0  /  TBili  0.2  /  DBili  x   /  AST  19  /  ALT  19  /  AlkPhos  100  12-10 @ 06:27    PT/INR - ( 12-10 @ 06:26 )   PT: 10.4 sec;   INR: 0.90 ratio    PTT - ( 12-10 @ 06:26 )  PTT:29.0 sec    Uric Acid: (12-10 @ 06:27)  5.9      Fibrinogen: (12-10 @ 06:27)  --       LDH: (12-10 @ 06:27)  170        MEDICATIONS  (STANDING):  aspirin enteric coated 162 milliGRAM(s) Oral daily  folic acid 1 milliGRAM(s) Oral daily  heparin   Injectable 5000 Unit(s) SubCutaneous every 12 hours  prenatal multivitamin 1 Tablet(s) Oral daily    MEDICATIONS  (PRN):   R3 Antepartum Note, HD#8    Patient seen and examined at bedside, no acute overnight events. No acute complaints.  Denies change in fluid color or smell.  Pt reports +FM, denies VB, ctx, HA, epigastric pain, blurred vision, CP, SOB, N/V, fevers, and chills.    Vital Signs Last 24 Hours  T(C): 36.4 (12-11-22 @ 00:23), Max: 36.9 (12-10-22 @ 08:51)  HR: 76 (12-11-22 @ 00:23) (68 - 97)  BP: 116/74 (12-11-22 @ 00:23) (116/74 - 142/90)  RR: 17 (12-11-22 @ 00:23) (17 - 18)  SpO2: 98% (12-11-22 @ 00:23) (95% - 98%)    CAPILLARY BLOOD GLUCOSE          Physical Exam:  General: NAD  Abdomen: Soft, non-tender, gravid  Ext: No pain or swelling    NST reactive overnight    Labs:             12.1   11.72 )-----------( 218      ( 12-10 @ 06:26 )             35.1     12-10 @ 06:27    136  |  106  |  9   ----------------------------<  85  4.0   |  20  |  0.51    Ca    8.5      12-10 @ 06:27    TPro  6.0  /  Alb  3.0  /  TBili  0.2  /  DBili  x   /  AST  19  /  ALT  19  /  AlkPhos  100  12-10 @ 06:27    PT/INR - ( 12-10 @ 06:26 )   PT: 10.4 sec;   INR: 0.90 ratio    PTT - ( 12-10 @ 06:26 )  PTT:29.0 sec    Uric Acid: (12-10 @ 06:27)  5.9      Fibrinogen: (12-10 @ 06:27)  --       LDH: (12-10 @ 06:27)  170        MEDICATIONS  (STANDING):  aspirin enteric coated 162 milliGRAM(s) Oral daily  folic acid 1 milliGRAM(s) Oral daily  heparin   Injectable 5000 Unit(s) SubCutaneous every 12 hours  prenatal multivitamin 1 Tablet(s) Oral daily    MEDICATIONS  (PRN):

## 2022-12-12 ENCOUNTER — TRANSCRIPTION ENCOUNTER (OUTPATIENT)
Age: 32
End: 2022-12-12

## 2022-12-12 ENCOUNTER — ASOB RESULT (OUTPATIENT)
Age: 32
End: 2022-12-12

## 2022-12-12 ENCOUNTER — NON-APPOINTMENT (OUTPATIENT)
Age: 32
End: 2022-12-12

## 2022-12-12 LAB
ALBUMIN SERPL ELPH-MCNC: 3.5 G/DL — SIGNIFICANT CHANGE UP (ref 3.3–5)
ALP SERPL-CCNC: 117 U/L — SIGNIFICANT CHANGE UP (ref 40–120)
ALT FLD-CCNC: 16 U/L — SIGNIFICANT CHANGE UP (ref 10–45)
ANION GAP SERPL CALC-SCNC: 14 MMOL/L — SIGNIFICANT CHANGE UP (ref 5–17)
APTT BLD: 26 SEC — LOW (ref 27.5–35.5)
AST SERPL-CCNC: 14 U/L — SIGNIFICANT CHANGE UP (ref 10–40)
BASOPHILS # BLD AUTO: 0.07 K/UL — SIGNIFICANT CHANGE UP (ref 0–0.2)
BASOPHILS NFR BLD AUTO: 0.5 % — SIGNIFICANT CHANGE UP (ref 0–2)
BILIRUB SERPL-MCNC: 0.3 MG/DL — SIGNIFICANT CHANGE UP (ref 0.2–1.2)
BUN SERPL-MCNC: 10 MG/DL — SIGNIFICANT CHANGE UP (ref 7–23)
CALCIUM SERPL-MCNC: 8.9 MG/DL — SIGNIFICANT CHANGE UP (ref 8.4–10.5)
CHLORIDE SERPL-SCNC: 101 MMOL/L — SIGNIFICANT CHANGE UP (ref 96–108)
CO2 SERPL-SCNC: 22 MMOL/L — SIGNIFICANT CHANGE UP (ref 22–31)
CREAT SERPL-MCNC: 0.48 MG/DL — LOW (ref 0.5–1.3)
EGFR: 127 ML/MIN/1.73M2 — SIGNIFICANT CHANGE UP
EOSINOPHIL # BLD AUTO: 0.24 K/UL — SIGNIFICANT CHANGE UP (ref 0–0.5)
EOSINOPHIL NFR BLD AUTO: 1.6 % — SIGNIFICANT CHANGE UP (ref 0–6)
FIBRINOGEN PPP-MCNC: 518 MG/DL — HIGH (ref 200–445)
GLUCOSE SERPL-MCNC: 75 MG/DL — SIGNIFICANT CHANGE UP (ref 70–99)
HCT VFR BLD CALC: 37.1 % — SIGNIFICANT CHANGE UP (ref 34.5–45)
HGB BLD-MCNC: 12.8 G/DL — SIGNIFICANT CHANGE UP (ref 11.5–15.5)
IMM GRANULOCYTES NFR BLD AUTO: 0.7 % — SIGNIFICANT CHANGE UP (ref 0–0.9)
INR BLD: 0.92 RATIO — SIGNIFICANT CHANGE UP (ref 0.88–1.16)
LDH SERPL L TO P-CCNC: 194 U/L — SIGNIFICANT CHANGE UP (ref 50–242)
LYMPHOCYTES # BLD AUTO: 22 % — SIGNIFICANT CHANGE UP (ref 13–44)
LYMPHOCYTES # BLD AUTO: 3.4 K/UL — HIGH (ref 1–3.3)
MCHC RBC-ENTMCNC: 30.9 PG — SIGNIFICANT CHANGE UP (ref 27–34)
MCHC RBC-ENTMCNC: 34.5 GM/DL — SIGNIFICANT CHANGE UP (ref 32–36)
MCV RBC AUTO: 89.6 FL — SIGNIFICANT CHANGE UP (ref 80–100)
MONOCYTES # BLD AUTO: 1.39 K/UL — HIGH (ref 0–0.9)
MONOCYTES NFR BLD AUTO: 9 % — SIGNIFICANT CHANGE UP (ref 2–14)
NEUTROPHILS # BLD AUTO: 10.25 K/UL — HIGH (ref 1.8–7.4)
NEUTROPHILS NFR BLD AUTO: 66.2 % — SIGNIFICANT CHANGE UP (ref 43–77)
NRBC # BLD: 0 /100 WBCS — SIGNIFICANT CHANGE UP (ref 0–0)
PLATELET # BLD AUTO: 234 K/UL — SIGNIFICANT CHANGE UP (ref 150–400)
POTASSIUM SERPL-MCNC: 4.2 MMOL/L — SIGNIFICANT CHANGE UP (ref 3.5–5.3)
POTASSIUM SERPL-SCNC: 4.2 MMOL/L — SIGNIFICANT CHANGE UP (ref 3.5–5.3)
PROT SERPL-MCNC: 7.1 G/DL — SIGNIFICANT CHANGE UP (ref 6–8.3)
PROTHROM AB SERPL-ACNC: 10.6 SEC — SIGNIFICANT CHANGE UP (ref 10.5–13.4)
RBC # BLD: 4.14 M/UL — SIGNIFICANT CHANGE UP (ref 3.8–5.2)
RBC # FLD: 12.3 % — SIGNIFICANT CHANGE UP (ref 10.3–14.5)
SARS-COV-2 RNA SPEC QL NAA+PROBE: SIGNIFICANT CHANGE UP
SODIUM SERPL-SCNC: 137 MMOL/L — SIGNIFICANT CHANGE UP (ref 135–145)
URATE SERPL-MCNC: 6.1 MG/DL — SIGNIFICANT CHANGE UP (ref 2.5–7)
WBC # BLD: 15.46 K/UL — HIGH (ref 3.8–10.5)
WBC # FLD AUTO: 15.46 K/UL — HIGH (ref 3.8–10.5)

## 2022-12-12 PROCEDURE — 99232 SBSQ HOSP IP/OBS MODERATE 35: CPT

## 2022-12-12 PROCEDURE — 88307 TISSUE EXAM BY PATHOLOGIST: CPT | Mod: 26

## 2022-12-12 RX ORDER — NALBUPHINE HYDROCHLORIDE 10 MG/ML
2.5 INJECTION, SOLUTION INTRAMUSCULAR; INTRAVENOUS; SUBCUTANEOUS EVERY 6 HOURS
Refills: 0 | Status: DISCONTINUED | OUTPATIENT
Start: 2022-12-12 | End: 2022-12-14

## 2022-12-12 RX ORDER — SODIUM CHLORIDE 9 MG/ML
1000 INJECTION, SOLUTION INTRAVENOUS
Refills: 0 | Status: DISCONTINUED | OUTPATIENT
Start: 2022-12-12 | End: 2022-12-13

## 2022-12-12 RX ORDER — ONDANSETRON 8 MG/1
4 TABLET, FILM COATED ORAL EVERY 6 HOURS
Refills: 0 | Status: DISCONTINUED | OUTPATIENT
Start: 2022-12-12 | End: 2022-12-14

## 2022-12-12 RX ORDER — CHLORHEXIDINE GLUCONATE 213 G/1000ML
1 SOLUTION TOPICAL ONCE
Refills: 0 | Status: DISCONTINUED | OUTPATIENT
Start: 2022-12-12 | End: 2022-12-13

## 2022-12-12 RX ORDER — OXYCODONE HYDROCHLORIDE 5 MG/1
5 TABLET ORAL
Refills: 0 | Status: DISCONTINUED | OUTPATIENT
Start: 2022-12-12 | End: 2022-12-12

## 2022-12-12 RX ORDER — NALOXONE HYDROCHLORIDE 4 MG/.1ML
0.1 SPRAY NASAL
Refills: 0 | Status: DISCONTINUED | OUTPATIENT
Start: 2022-12-12 | End: 2022-12-14

## 2022-12-12 RX ORDER — DIPHENHYDRAMINE HCL 50 MG
25 CAPSULE ORAL EVERY 4 HOURS
Refills: 0 | Status: DISCONTINUED | OUTPATIENT
Start: 2022-12-12 | End: 2022-12-14

## 2022-12-12 RX ORDER — CITRIC ACID/SODIUM CITRATE 300-500 MG
15 SOLUTION, ORAL ORAL EVERY 6 HOURS
Refills: 0 | Status: DISCONTINUED | OUTPATIENT
Start: 2022-12-12 | End: 2022-12-13

## 2022-12-12 RX ORDER — OXYTOCIN 10 UNIT/ML
2 VIAL (ML) INJECTION
Qty: 30 | Refills: 0 | Status: DISCONTINUED | OUTPATIENT
Start: 2022-12-12 | End: 2022-12-16

## 2022-12-12 RX ORDER — MORPHINE SULFATE 50 MG/1
2 CAPSULE, EXTENDED RELEASE ORAL ONCE
Refills: 0 | Status: DISCONTINUED | OUTPATIENT
Start: 2022-12-12 | End: 2022-12-14

## 2022-12-12 RX ORDER — OXYTOCIN 10 UNIT/ML
333.33 VIAL (ML) INJECTION
Qty: 20 | Refills: 0 | Status: DISCONTINUED | OUTPATIENT
Start: 2022-12-12 | End: 2022-12-16

## 2022-12-12 RX ADMIN — Medication 12 MILLIGRAM(S): at 15:57

## 2022-12-12 RX ADMIN — Medication 2 MILLIUNIT(S)/MIN: at 15:34

## 2022-12-12 RX ADMIN — SODIUM CHLORIDE 125 MILLILITER(S): 9 INJECTION, SOLUTION INTRAVENOUS at 15:34

## 2022-12-12 RX ADMIN — HEPARIN SODIUM 5000 UNIT(S): 5000 INJECTION INTRAVENOUS; SUBCUTANEOUS at 08:59

## 2022-12-12 NOTE — CHART NOTE - NSCHARTNOTEFT_GEN_A_CORE
R3 Chart Note     Patient was seen and examined at bedside ( triage). Patient is having contractions however not too  uncomfortable at this time. Continues to have LOF. Denies vaginal bleeding. Endorses FM     Baseline 150, moderate varaibility, -accels, -decels   [place]: Irregular      SVE:1/0/-3     31 y/o  @32 weeks presenting with PPROM@30w6d with a nonreactive NST and BPP 6/8 for no movement     -Continue to monitor    -Monitor for signs of labor     d/w Dr. Soy Feliciano, PGY3

## 2022-12-12 NOTE — CHART NOTE - NSCHARTNOTEFT_GEN_A_CORE
ATTG on service note    Pt interviewed at the bedside.   admisison and labor course reviewed.    Pt is a 31 yo  @ 32 wks admitted for PPROM @ 30.6 wks now for IOL for NRNST and poor BPP today with abnormal Dopplers.    Pt s/p Beta, Latency Abx, and Mag.  During hospital course dx with GHTN, IUGR.  Pt admitted at 1/long/-3.  Started on Pitocin for Induction.      T(C): 37.0 (22 @ 18:42), Max: 37.0 (22 @ 18:42)  HR: 68 (22 @ 19:54) (61 - 96)  BP: 132/83 (22 @ 18:42) (116/70 - 132/83)  RR: 20 (22 @ 18:42) (17 - 20)  SpO2: 95% (22 @ 19:54) (92% - 100%)    VE-1/long/-3  FHT-baseline 150, minimal to moderate variability, variable decels, +accels  toco-q 3  EFW-1397 gms from ATU scan on 12/10, 3lbs 1 oz, 2%  Pelvis tested to 6lbs 2 oz  GBS neg    Labs   CBC-115.4/12.8/37.1/234  creat- 0.4  LFTS's-  Hep B sAg- nr  HIV - nr  MBT-O+  COVID neg      a/p:31 yo P1 @ 32wks admitted for PPROM @ 30.6 wks , dx with GHTN since then, now with NRNST today with BPP 4/8 (tone,mvt) in the setting of newly dx IUGR and abnormal Dopplers for IOL.   FHT continues to have variable decel with periods of minimal variability and after reviewing FHT today this is c/w pattern which led to IOL.  Pt currently on Pitocin.  GBS neg.    -cont intrauterine resuscitation   -cont Pitocin for IOL as long FHT does not become progressively worse  -cont to monitor BPs for progression to sPEC  -epidural for pain mngt when desires  -GBS neg  -moderate PPH risk for prolonged ROM  -SCD for DVT ppx  -male fetus, declined circ  -labs reviewed and no further action required at this time  -cont to  on contraception prior to dc as pt declining any LARC or other method  -anticipate MILI Yung

## 2022-12-12 NOTE — CHART NOTE - NSCHARTNOTEFT_GEN_A_CORE
FHT: 150/mod/-accels/few variable & late decels  [place]: seemingly wiliam q8-10min    FHT reviewed, nonreactive (few accels and only 10x10). BPP performed by sonographer tech and 6/8 (no movement). Pt to be brought to floor for continuous monitoring.       Vital Signs Last 24 Hrs  T(C): 36.9 (12 Dec 2022 09:29), Max: 36.9 (12 Dec 2022 09:29)  T(F): 98.4 (12 Dec 2022 09:29), Max: 98.4 (12 Dec 2022 09:29)  HR: 76 (12 Dec 2022 13:07) (73 - 93)  BP: 124/83 (12 Dec 2022 12:58) (116/70 - 129/83)  RR: 18 (12 Dec 2022 09:29) (17 - 18)  SpO2: 98% (12 Dec 2022 13:07) (97% - 99%)    Patient status and plan of care discussed with Dr. Olivier.     Paulette Woodard MD  OBGYN PGY4

## 2022-12-12 NOTE — CHART NOTE - NSCHARTNOTEFT_GEN_A_CORE
MFM Follow Up Note    Patient brought over to triage from antepartum floor for nonreactive NST. BPP per sonographer 4/8 (no tone or movements). Patient reports irregular cramping/contractions, decreased fetal movements throughout the day today.    BPP repeated by Dr. Snider, 8/8 however overall subjectively decreased movements. AEDV noted on Dopplers.   Tracing reviewed, nonreactive with intermittent late decelerations and irregular contractions.   Given PPROM with severe fetal growth restriction and now absent end-diastolic flow with nonreactive NST, delivery is recommended.   Given current moderate variability with no decelerations and vertex presentation, vaginal delivery is not contraindicated. Patient counseled that fetus may not tolerate contractions due to fetal growth restriction with abnormal Dopplers.   BMZ administered 12/4-5, patient not eligible for rescue BMZ.   Patient counseled on and declines contraception.     Patient seen and d/w Dr. Snider, plan communicated with Safety Officers   Raz Phillips PGY-5 MFM Follow Up Note    Patient brought over to triage from antepartum floor for nonreactive NST. BPP per sonographer  (no tone or movements). Patient reports irregular cramping/contractions, decreased fetal movements throughout the day today.    BPP repeated by Dr. Snider,  however overall subjectively decreased movements. AEDV noted on Dopplers.   Tracing reviewed, nonreactive with intermittent late decelerations and irregular contractions.   Given PPROM with severe fetal growth restriction and now absent end-diastolic flow with nonreactive NST, delivery is recommended.   Given current moderate variability with no decelerations and vertex presentation, vaginal delivery is not contraindicated. Patient counseled that fetus may not tolerate contractions due to fetal growth restriction with abnormal Dopplers.   BMZ administered -. Will offer rescue course.  Patient counseled on and declines contraception.     Patient seen and d/w Dr. Snider, plan communicated with Safety Officers   Raz Phillips PGY-5    MFM attending    31 y/o  @ 32 weeks with PROM, FGR and elevated Dopplers, GHTN, today patient reporting decreased fetal movement, NST was nonreactive with min-mod variability, no 15 x 15 accels, and late decelerations. UA Dopplers today with AEDV. We discussed recommended delivery at this time in the setting of nonreassuring FHT. BPP 8/10. Although patient can attemt vaginal delivery is no decels and mod variability, she understands that there is a high possibility she might require a . NICU will be notified. S/p BTM -. Repeat course can be administered as early as 7 days from the prior dose, therefore will discuss option with pt.     I was present and participated in all key portions of assessment and plan. Agree with fellow's note and plan of care.     Dr Tylor Espinoza

## 2022-12-12 NOTE — PROGRESS NOTE ADULT - ASSESSMENT
31 y/o  @32 weeks presenting with PPROM@30w6d. Maternal and fetal status reassuring at this time.     #PPROM  - s/p latency antibiotics(-12/10)   - BMZ for FLM (-)  - s/p Mg for fetal neuroprotection  - NST BID   -Urine GC/CT negative    #gHTN  -Monitor blood pressures  -HELLP labs wnl , P/C:0.2  -HELLP labs q3d     #Fetal wellbeing  -newly diagnosed FGR(2%) on  with elevated, but not absent UA Dopplers, for BPP this morning   - BMZ/Monitoring as above  - s/p NICU consult  - GBS neg  - s/p Tdap    #Maternal wellbeing  - Regular diet  - HSQ/SCDs for DVT ppx  - PNV     Poonam Feliciano, PGY3  31 y/o  @32 weeks presenting with PPROM@30w6d. Maternal and fetal status reassuring at this time.     #PPROM  - s/p latency antibiotics(-12/10)   - BMZ for FLM (-)  - s/p Mg for fetal neuroprotection  - NST BID   -Urine GC/CT negative    #gHTN  -Monitor blood pressures  -HELLP labs wnl , P/C:0.2  -HELLP labs q3d     #Fetal wellbeing  -newly diagnosed FGR(2%) on  with elevated, but not absent UA Dopplers, for BPP this morning   - BMZ/Monitoring as above  - s/p NICU consult  - GBS neg  - s/p Tdap    #Maternal wellbeing  - Regular diet  - HSQ/SCDs for DVT ppx  - PNV     Poonam Feliciano, PGY3     ------    MFM Fellow Attestation    The patient is a 31 yo  at 32 weeks admitted with PPROM since 30w6d, gestational hypertension now with severe fetal growth restriction with elevated UA Dopplers. Patient is s/p 1 week latency antibiotics and received betamethasone on admission for fetal lung maturity (-). She remains asymptomatic aside from continued leaking with no signs/symptoms of intra-amniotic infection or  labor. Vertex presentation. GBS negative. Patient's blood pressures range from normal to mild, she denies symptoms of preeclampsia with no serum abnormalities last drawn on 12/10. Will continue inpatient expectant management, observation with twice weekly BPP with Dopplers, twice daily NST with tentative delivery at 34 weeks (will defer late  delivery given growth restriction with abnormal Dopplers).     Raz Phillips PGY-5  33 y/o  @32 weeks presenting with PPROM@30w6d. Maternal and fetal status reassuring at this time.     #PPROM  - s/p latency antibiotics(-12/10)   - BMZ for FLM (-)  - s/p Mg for fetal neuroprotection  - NST BID   -Urine GC/CT negative    #gHTN  -Monitor blood pressures  -HELLP labs wnl , P/C:0.2  -HELLP labs q3d     #Fetal wellbeing  -newly diagnosed FGR(2%) on  with elevated, but not absent UA Dopplers, for BPP this morning   - BMZ/Monitoring as above  - s/p NICU consult  - GBS neg  - s/p Tdap    #Maternal wellbeing  - Regular diet  - HSQ/SCDs for DVT ppx  - PNV     Poonam Feliciano, PGY3     ------    MFM Fellow Attestation    The patient is a 31 yo  at 32 weeks admitted with PPROM since 30w6d, gestational hypertension now with severe fetal growth restriction with elevated UA Dopplers. Patient is s/p 1 week latency antibiotics and received betamethasone on admission for fetal lung maturity (-). She remains asymptomatic aside from continued leaking with no signs/symptoms of intra-amniotic infection or  labor. Vertex presentation. GBS negative. Patient's blood pressures range from normal to mild, she denies symptoms of preeclampsia with no serum abnormalities last drawn on 12/10. Will continue inpatient expectant management, observation with twice weekly BPP with Dopplers, twice daily NST with tentative delivery at 34 weeks (will defer late  delivery given growth restriction with abnormal Dopplers). Remains undecided on contraception.    Raz Phillips PGY-5

## 2022-12-12 NOTE — PROGRESS NOTE ADULT - SUBJECTIVE AND OBJECTIVE BOX
R3 Antepartum Note     Patient seen and examined at bedside, no acute overnight events. No acute complaints. Pt reports +FM, denies LOF, VB, ctx, HA, epigastric pain, blurred vision, CP, SOB, N/V, fevers, and chills.    Vital Signs Last 24 Hours  T(C): 36.6 (12-12-22 @ 05:49), Max: 36.8 (12-11-22 @ 21:31)  HR: 73 (12-12-22 @ 05:49) (73 - 103)  BP: 124/84 (12-12-22 @ 05:49) (116/70 - 129/83)  RR: 17 (12-12-22 @ 05:49) (17 - 18)  SpO2: 97% (12-12-22 @ 05:49) (97% - 100%)    CAPILLARY BLOOD GLUCOSE          Physical Exam:  General: NAD  Abdomen: Soft, non-tender, gravid  Ext: No pain or swelling    NST reactive overnight    Labs:             12.1   11.72 )-----------( 218      ( 12-10 @ 06:26 )             35.1     12-10 @ 06:27    136  |  106  |  9   ----------------------------<  85  4.0   |  20  |  0.51    Ca    8.5      12-10 @ 06:27    TPro  6.0  /  Alb  3.0  /  TBili  0.2  /  DBili  x   /  AST  19  /  ALT  19  /  AlkPhos  100  12-10 @ 06:27    PT/INR - ( 12-10 @ 06:26 )   PT: 10.4 sec;   INR: 0.90 ratio    PTT - ( 12-10 @ 06:26 )  PTT:29.0 sec    Uric Acid: (12-10 @ 06:27)  5.9      Fibrinogen: (12-10 @ 06:27)  --       LDH: (12-10 @ 06:27)  170        MEDICATIONS  (STANDING):  aspirin enteric coated 162 milliGRAM(s) Oral daily  folic acid 1 milliGRAM(s) Oral daily  heparin   Injectable 5000 Unit(s) SubCutaneous every 12 hours  prenatal multivitamin 1 Tablet(s) Oral daily    MEDICATIONS  (PRN):

## 2022-12-12 NOTE — OB PROVIDER LABOR PROGRESS NOTE - ASSESSMENT
Plan   cont pitocin, uptitrate as able  cont resuscitative measures PRN, will place IUPC and amnioinfuse if variables persist  cont EFM/[place]  anticipate      Tati Trimble MD PGY3   d/w Dr. Yung 
Plan   cont pitocin   benefits of early epidural discussed, patient in agreement. Anesthesia called for placement   cont EFM/toco, resuscitative measures PRN. Starting AI, will watch tracing closely   Tracing reviewed at [facility] safety rounds, plan to cont intrauterine resuscitation, if persistent decels with >50% of ctxs will discuss c/s with patient     Tati Trimble MD PGY3   d/w Dr. Yung

## 2022-12-13 LAB
ALBUMIN SERPL ELPH-MCNC: 3.2 G/DL — LOW (ref 3.3–5)
ALP SERPL-CCNC: 116 U/L — SIGNIFICANT CHANGE UP (ref 40–120)
ALT FLD-CCNC: 18 U/L — SIGNIFICANT CHANGE UP (ref 10–45)
ANION GAP SERPL CALC-SCNC: 14 MMOL/L — SIGNIFICANT CHANGE UP (ref 5–17)
ANISOCYTOSIS BLD QL: SLIGHT — SIGNIFICANT CHANGE UP
APTT BLD: 25 SEC — LOW (ref 27.5–35.5)
AST SERPL-CCNC: 30 U/L — SIGNIFICANT CHANGE UP (ref 10–40)
BASOPHILS # BLD AUTO: 0 K/UL — SIGNIFICANT CHANGE UP (ref 0–0.2)
BASOPHILS NFR BLD AUTO: 0 % — SIGNIFICANT CHANGE UP (ref 0–2)
BILIRUB SERPL-MCNC: 0.4 MG/DL — SIGNIFICANT CHANGE UP (ref 0.2–1.2)
BUN SERPL-MCNC: 7 MG/DL — SIGNIFICANT CHANGE UP (ref 7–23)
BURR CELLS BLD QL SMEAR: PRESENT — SIGNIFICANT CHANGE UP
CALCIUM SERPL-MCNC: 9 MG/DL — SIGNIFICANT CHANGE UP (ref 8.4–10.5)
CHLORIDE SERPL-SCNC: 98 MMOL/L — SIGNIFICANT CHANGE UP (ref 96–108)
CO2 SERPL-SCNC: 21 MMOL/L — LOW (ref 22–31)
CREAT SERPL-MCNC: 0.46 MG/DL — LOW (ref 0.5–1.3)
DACRYOCYTES BLD QL SMEAR: SLIGHT — SIGNIFICANT CHANGE UP
EGFR: 129 ML/MIN/1.73M2 — SIGNIFICANT CHANGE UP
EOSINOPHIL # BLD AUTO: 0.24 K/UL — SIGNIFICANT CHANGE UP (ref 0–0.5)
EOSINOPHIL NFR BLD AUTO: 0.9 % — SIGNIFICANT CHANGE UP (ref 0–6)
FIBRINOGEN PPP-MCNC: 623 MG/DL — HIGH (ref 200–445)
GLUCOSE SERPL-MCNC: 133 MG/DL — HIGH (ref 70–99)
HCT VFR BLD CALC: 35.4 % — SIGNIFICANT CHANGE UP (ref 34.5–45)
HGB BLD-MCNC: 12 G/DL — SIGNIFICANT CHANGE UP (ref 11.5–15.5)
INR BLD: 0.96 RATIO — SIGNIFICANT CHANGE UP (ref 0.88–1.16)
LDH SERPL L TO P-CCNC: 296 U/L — HIGH (ref 50–242)
LYMPHOCYTES # BLD AUTO: 0.71 K/UL — LOW (ref 1–3.3)
LYMPHOCYTES # BLD AUTO: 2.6 % — LOW (ref 13–44)
MANUAL SMEAR VERIFICATION: SIGNIFICANT CHANGE UP
MCHC RBC-ENTMCNC: 30.5 PG — SIGNIFICANT CHANGE UP (ref 27–34)
MCHC RBC-ENTMCNC: 33.9 GM/DL — SIGNIFICANT CHANGE UP (ref 32–36)
MCV RBC AUTO: 90.1 FL — SIGNIFICANT CHANGE UP (ref 80–100)
MONOCYTES # BLD AUTO: 1.14 K/UL — HIGH (ref 0–0.9)
MONOCYTES NFR BLD AUTO: 4.2 % — SIGNIFICANT CHANGE UP (ref 2–14)
NEUTROPHILS # BLD AUTO: 25.11 K/UL — HIGH (ref 1.8–7.4)
NEUTROPHILS NFR BLD AUTO: 86.4 % — HIGH (ref 43–77)
NEUTS BAND # BLD: 5.9 % — SIGNIFICANT CHANGE UP (ref 0–8)
OVALOCYTES BLD QL SMEAR: SLIGHT — SIGNIFICANT CHANGE UP
PLAT MORPH BLD: NORMAL — SIGNIFICANT CHANGE UP
PLATELET # BLD AUTO: 197 K/UL — SIGNIFICANT CHANGE UP (ref 150–400)
POIKILOCYTOSIS BLD QL AUTO: SLIGHT — SIGNIFICANT CHANGE UP
POLYCHROMASIA BLD QL SMEAR: SLIGHT — SIGNIFICANT CHANGE UP
POTASSIUM SERPL-MCNC: 4.5 MMOL/L — SIGNIFICANT CHANGE UP (ref 3.5–5.3)
POTASSIUM SERPL-SCNC: 4.5 MMOL/L — SIGNIFICANT CHANGE UP (ref 3.5–5.3)
PROT SERPL-MCNC: 6.8 G/DL — SIGNIFICANT CHANGE UP (ref 6–8.3)
PROTHROM AB SERPL-ACNC: 11.1 SEC — SIGNIFICANT CHANGE UP (ref 10.5–13.4)
RBC # BLD: 3.93 M/UL — SIGNIFICANT CHANGE UP (ref 3.8–5.2)
RBC # FLD: 11.9 % — SIGNIFICANT CHANGE UP (ref 10.3–14.5)
RBC BLD AUTO: ABNORMAL
SODIUM SERPL-SCNC: 133 MMOL/L — LOW (ref 135–145)
TARGETS BLD QL SMEAR: SLIGHT — SIGNIFICANT CHANGE UP
URATE SERPL-MCNC: 5.3 MG/DL — SIGNIFICANT CHANGE UP (ref 2.5–7)
WBC # BLD: 27.21 K/UL — HIGH (ref 3.8–10.5)
WBC # FLD AUTO: 27.21 K/UL — HIGH (ref 3.8–10.5)

## 2022-12-13 PROCEDURE — 59514 CESAREAN DELIVERY ONLY: CPT | Mod: U7

## 2022-12-13 RX ORDER — TETANUS TOXOID, REDUCED DIPHTHERIA TOXOID AND ACELLULAR PERTUSSIS VACCINE, ADSORBED 5; 2.5; 8; 8; 2.5 [IU]/.5ML; [IU]/.5ML; UG/.5ML; UG/.5ML; UG/.5ML
0.5 SUSPENSION INTRAMUSCULAR ONCE
Refills: 0 | Status: DISCONTINUED | OUTPATIENT
Start: 2022-12-13 | End: 2022-12-16

## 2022-12-13 RX ORDER — MAGNESIUM HYDROXIDE 400 MG/1
30 TABLET, CHEWABLE ORAL
Refills: 0 | Status: DISCONTINUED | OUTPATIENT
Start: 2022-12-13 | End: 2022-12-16

## 2022-12-13 RX ORDER — DIPHENHYDRAMINE HCL 50 MG
25 CAPSULE ORAL EVERY 6 HOURS
Refills: 0 | Status: DISCONTINUED | OUTPATIENT
Start: 2022-12-13 | End: 2022-12-16

## 2022-12-13 RX ORDER — OXYCODONE HYDROCHLORIDE 5 MG/1
5 TABLET ORAL ONCE
Refills: 0 | Status: DISCONTINUED | OUTPATIENT
Start: 2022-12-13 | End: 2022-12-16

## 2022-12-13 RX ORDER — IBUPROFEN 200 MG
600 TABLET ORAL EVERY 6 HOURS
Refills: 0 | Status: COMPLETED | OUTPATIENT
Start: 2022-12-13 | End: 2023-11-11

## 2022-12-13 RX ORDER — HEPARIN SODIUM 5000 [USP'U]/ML
5000 INJECTION INTRAVENOUS; SUBCUTANEOUS EVERY 12 HOURS
Refills: 0 | Status: DISCONTINUED | OUTPATIENT
Start: 2022-12-13 | End: 2022-12-16

## 2022-12-13 RX ORDER — ACETAMINOPHEN 500 MG
975 TABLET ORAL
Refills: 0 | Status: DISCONTINUED | OUTPATIENT
Start: 2022-12-13 | End: 2022-12-16

## 2022-12-13 RX ORDER — LANOLIN
1 OINTMENT (GRAM) TOPICAL EVERY 6 HOURS
Refills: 0 | Status: DISCONTINUED | OUTPATIENT
Start: 2022-12-13 | End: 2022-12-16

## 2022-12-13 RX ORDER — SIMETHICONE 80 MG/1
80 TABLET, CHEWABLE ORAL EVERY 4 HOURS
Refills: 0 | Status: DISCONTINUED | OUTPATIENT
Start: 2022-12-13 | End: 2022-12-16

## 2022-12-13 RX ORDER — KETOROLAC TROMETHAMINE 30 MG/ML
30 SYRINGE (ML) INJECTION EVERY 6 HOURS
Refills: 0 | Status: DISCONTINUED | OUTPATIENT
Start: 2022-12-13 | End: 2022-12-13

## 2022-12-13 RX ORDER — SODIUM CHLORIDE 9 MG/ML
1000 INJECTION, SOLUTION INTRAVENOUS
Refills: 0 | Status: DISCONTINUED | OUTPATIENT
Start: 2022-12-13 | End: 2022-12-16

## 2022-12-13 RX ORDER — OXYTOCIN 10 UNIT/ML
333.33 VIAL (ML) INJECTION
Qty: 20 | Refills: 0 | Status: DISCONTINUED | OUTPATIENT
Start: 2022-12-13 | End: 2022-12-16

## 2022-12-13 RX ORDER — OXYCODONE HYDROCHLORIDE 5 MG/1
5 TABLET ORAL
Refills: 0 | Status: DISCONTINUED | OUTPATIENT
Start: 2022-12-13 | End: 2022-12-16

## 2022-12-13 RX ADMIN — HEPARIN SODIUM 5000 UNIT(S): 5000 INJECTION INTRAVENOUS; SUBCUTANEOUS at 05:45

## 2022-12-13 RX ADMIN — Medication 30 MILLIGRAM(S): at 12:10

## 2022-12-13 RX ADMIN — Medication 30 MILLIGRAM(S): at 11:48

## 2022-12-13 RX ADMIN — Medication 975 MILLIGRAM(S): at 20:37

## 2022-12-13 RX ADMIN — Medication 975 MILLIGRAM(S): at 14:49

## 2022-12-13 RX ADMIN — HEPARIN SODIUM 5000 UNIT(S): 5000 INJECTION INTRAVENOUS; SUBCUTANEOUS at 18:18

## 2022-12-13 RX ADMIN — Medication 30 MILLIGRAM(S): at 05:45

## 2022-12-13 RX ADMIN — Medication 30 MILLIGRAM(S): at 06:23

## 2022-12-13 RX ADMIN — Medication 975 MILLIGRAM(S): at 15:30

## 2022-12-13 RX ADMIN — Medication 975 MILLIGRAM(S): at 09:17

## 2022-12-13 RX ADMIN — Medication 975 MILLIGRAM(S): at 09:54

## 2022-12-13 RX ADMIN — Medication 975 MILLIGRAM(S): at 04:10

## 2022-12-13 RX ADMIN — Medication 30 MILLIGRAM(S): at 23:53

## 2022-12-13 RX ADMIN — Medication 30 MILLIGRAM(S): at 18:18

## 2022-12-13 RX ADMIN — Medication 975 MILLIGRAM(S): at 21:20

## 2022-12-13 RX ADMIN — Medication 30 MILLIGRAM(S): at 18:35

## 2022-12-13 RX ADMIN — Medication 975 MILLIGRAM(S): at 05:00

## 2022-12-13 NOTE — OB RN DELIVERY SUMMARY - NSSELHIDDEN_OBGYN_ALL_OB_FT
[NS_DeliveryAttending1_OBGYN_ALL_OB_FT:[id number]],[NS_DeliveryAssist1_OBGYN_ALL_OB_FT:[id number]],[NS_DeliveryRN_OBGYN_ALL_OB_FT:[id number]=]

## 2022-12-13 NOTE — PROGRESS NOTE ADULT - SUBJECTIVE AND OBJECTIVE BOX
OB Postpartum Note:  Delivery    S: 33yo now POD #1 s/p LTCS. Her pain is well controlled. She is tolerating a regular diet but has not yet passed flatus. [place] remains in place. Denies N/V. Denies CP/SOB/lightheadedness/dizziness/headache/epigastric pain/changes in vision.    O:   Vitals:  Vital Signs Last 24 Hrs  T(C): 36.3 (13 Dec 2022 06:40), Max: 37.0 (12 Dec 2022 18:42)  T(F): 97.3 (13 Dec 2022 06:40), Max: 98.6 (12 Dec 2022 18:42)  HR: 62 (13 Dec 2022 03:00) (56 - 96)  BP: 147/87 (13 Dec 2022 03:00) (92/52 - 147/87)  BP(mean): 92 (13 Dec 2022 02:35) (69 - 92)  RR: 18 (13 Dec 2022 06:40) (18 - 20)  SpO2: 97% (13 Dec 2022 06:40) (92% - 100%)    Parameters below as of 13 Dec 2022 03:00  Patient On (Oxygen Delivery Method): room air        MEDICATIONS  (STANDING):  acetaminophen     Tablet .. 975 milliGRAM(s) Oral <User Schedule>  betamethasone Injectable 12 milliGRAM(s) IntraMuscular every 24 hours  diphtheria/tetanus/pertussis (acellular) Vaccine (Adacel) 0.5 milliLiter(s) IntraMuscular once  heparin   Injectable 5000 Unit(s) SubCutaneous every 12 hours  ibuprofen  Tablet. 600 milliGRAM(s) Oral every 6 hours  ketorolac   Injectable 30 milliGRAM(s) IV Push every 6 hours  lactated ringers. 1000 milliLiter(s) (125 mL/Hr) IV Continuous <Continuous>  morphine PF Epidural 2 milliGRAM(s) Epidural once  oxytocin Infusion 333.333 milliUNIT(s)/Min (1000 mL/Hr) IV Continuous <Continuous>  oxytocin Infusion 333.333 milliUNIT(s)/Min (1000 mL/Hr) IV Continuous <Continuous>  oxytocin Infusion. 2 milliUNIT(s)/Min (2 mL/Hr) IV Continuous <Continuous>    MEDICATIONS  (PRN):  diphenhydrAMINE 25 milliGRAM(s) Oral every 6 hours PRN Pruritus  diphenhydrAMINE 25 milliGRAM(s) Oral every 4 hours PRN Pruritus  lanolin Ointment 1 Application(s) Topical every 6 hours PRN Sore Nipples  magnesium hydroxide Suspension 30 milliLiter(s) Oral two times a day PRN Constipation  nalbuphine Injectable 2.5 milliGRAM(s) IV Push every 6 hours PRN Pruritus  naloxone Injectable 0.1 milliGRAM(s) IV Push every 3 minutes PRN For ANY of the following changes in patient status:  A. Breaths Per Minute LESS THAN 10, B. Oxygen saturation LESS THAN 90%, C. Sedation score of 6 for Stop After: 4 Times  ondansetron Injectable 4 milliGRAM(s) IV Push every 6 hours PRN Nausea  oxyCODONE    IR 5 milliGRAM(s) Oral every 3 hours PRN Mild Pain (1 - 3)  oxyCODONE    IR 5 milliGRAM(s) Oral every 3 hours PRN Moderate to Severe Pain (4-10)  oxyCODONE    IR 5 milliGRAM(s) Oral once PRN Moderate to Severe Pain (4-10)  simethicone 80 milliGRAM(s) Chew every 4 hours PRN Gas      Labs:  Blood type: O Positive  Rubella IgG: RPR: Negative                          12.0   27.21<H> >-----------< 197    (  @ 06:48 )             35.4                        12.8   15.46<H> >-----------< 234    (  @ 16:18 )             37.1    22 @ 16:18      137  |  101  |  10  ----------------------------<  75  4.2   |  22  |  0.48<L>        Ca    8.9      12 Dec 2022 16:18    TPro  7.1  /  Alb  3.5  /  TBili  0.3  /  DBili  x   /  AST  14  /  ALT  16  /  AlkPhos  117  22 @ 16:18          PE:  General: NAD, patient resting comfortably in bed  Abdomen: Mildly distended, appropriately tender. Fundus firm.  Incision: Clean, dry, intact.  : appropriate amount of lochia on pad  Extremities: SCDs in place, no erythema

## 2022-12-13 NOTE — OB NEONATOLOGY/PEDIATRICIAN DELIVERY SUMMARY - NSPEDSNEONOTESA_OBGYN_ALL_OB_FT
Called to urgent CS delivery for category II tracing of IUGR baby at 32 weeks gestation. Baby is a 32 week male born to a 33 yo  mother via urgent unscheduled primary CS. Maternal blood type O+. Mom has no PMH. Pregnancy complicated by IUGR (2%) with absent end diastolic velocity, PPROM on . GBS unknown, treated with ampicillin - and amoxicillin -12/10. Prenatal labs neg/neg/I/NR. COVID negative. SROM clear on . Cord clamping delayed 30 seconds. Baby born vigorous and crying spontaneously. Warmed, dried, stimulated, suctioned. Apgars 9/9. Mother consents to circ. Baby admitted to NICU for further management of prematurity. Parents updated and all questions answered.

## 2022-12-13 NOTE — PROGRESS NOTE ADULT - SUBJECTIVE AND OBJECTIVE BOX
Day 1 of Anesthesia Pain Management Service    SUBJECTIVE: Doing ok  Pain Scale Score:          [X] Refer to charted pain scores    THERAPY:  s/p   2  mg PF epidural morphine on 12\13\2022      MEDICATIONS  (STANDING):  acetaminophen     Tablet .. 975 milliGRAM(s) Oral <User Schedule>  betamethasone Injectable 12 milliGRAM(s) IntraMuscular every 24 hours  diphtheria/tetanus/pertussis (acellular) Vaccine (Adacel) 0.5 milliLiter(s) IntraMuscular once  heparin   Injectable 5000 Unit(s) SubCutaneous every 12 hours  ibuprofen  Tablet. 600 milliGRAM(s) Oral every 6 hours  ketorolac   Injectable 30 milliGRAM(s) IV Push every 6 hours  lactated ringers. 1000 milliLiter(s) (125 mL/Hr) IV Continuous <Continuous>  morphine PF Epidural 2 milliGRAM(s) Epidural once  oxytocin Infusion 333.333 milliUNIT(s)/Min (1000 mL/Hr) IV Continuous <Continuous>  oxytocin Infusion 333.333 milliUNIT(s)/Min (1000 mL/Hr) IV Continuous <Continuous>  oxytocin Infusion. 2 milliUNIT(s)/Min (2 mL/Hr) IV Continuous <Continuous>    MEDICATIONS  (PRN):  diphenhydrAMINE 25 milliGRAM(s) Oral every 6 hours PRN Pruritus  diphenhydrAMINE 25 milliGRAM(s) Oral every 4 hours PRN Pruritus  lanolin Ointment 1 Application(s) Topical every 6 hours PRN Sore Nipples  magnesium hydroxide Suspension 30 milliLiter(s) Oral two times a day PRN Constipation  nalbuphine Injectable 2.5 milliGRAM(s) IV Push every 6 hours PRN Pruritus  naloxone Injectable 0.1 milliGRAM(s) IV Push every 3 minutes PRN For ANY of the following changes in patient status:  A. Breaths Per Minute LESS THAN 10, B. Oxygen saturation LESS THAN 90%, C. Sedation score of 6 for Stop After: 4 Times  ondansetron Injectable 4 milliGRAM(s) IV Push every 6 hours PRN Nausea  oxyCODONE    IR 5 milliGRAM(s) Oral every 3 hours PRN Mild Pain (1 - 3)  oxyCODONE    IR 5 milliGRAM(s) Oral every 3 hours PRN Moderate to Severe Pain (4-10)  oxyCODONE    IR 5 milliGRAM(s) Oral once PRN Moderate to Severe Pain (4-10)  simethicone 80 milliGRAM(s) Chew every 4 hours PRN Gas      OBJECTIVE:    Sedation:        	[X] Alert	 [ ] Drowsy	[ ] Arousable      [ ] Asleep       [ ] Unresponsive    Side Effects:	[X] None 	[ ] Nausea	[ ] Vomiting         [ ] Pruritus  		[ ] Weakness            [ ] Numbness	          [ ] Other:    Vital Signs Last 24 Hrs  T(C): 36.4 (13 Dec 2022 08:59), Max: 37.0 (12 Dec 2022 18:42)  T(F): 97.6 (13 Dec 2022 08:59), Max: 98.6 (12 Dec 2022 18:42)  HR: 85 (13 Dec 2022 08:59) (56 - 96)  BP: 122/86 (13 Dec 2022 08:59) (92/52 - 147/87)  BP(mean): 92 (13 Dec 2022 02:35) (69 - 92)  RR: 18 (13 Dec 2022 08:59) (18 - 20)  SpO2: 97% (13 Dec 2022 08:59) (92% - 100%)    Parameters below as of 13 Dec 2022 08:59  Patient On (Oxygen Delivery Method): room air        ASSESSMENT/ PLAN  [X] Patient  to be transitioned to prn analgesics after 24 hours  [X] Pain management per primary service, pain service to sign off   [X]Documentation and Verification of current medications

## 2022-12-13 NOTE — OB RN INTRAOPERATIVE NOTE - NSSELHIDDEN_OBGYN_ALL_OB_FT
[NS_DeliveryAttending1_OBGYN_ALL_OB_FT:[id number]],[NS_DeliveryAssist1_OBGYN_ALL_OB_FT:[id number]],[NS_DeliveryRN_OBGYN_ALL_OB_FT:[id number]=]

## 2022-12-13 NOTE — CHART NOTE - NSCHARTNOTEFT_GEN_A_CORE
Late Entry due to pt Care    Called to assess pt's labor progress in the setting of recurrent variable decels.  VE performed and 1/80/-3  Pitocin was decreased to 8mu/min for decels  At the bedside, FHR down to 60's with slow recovery to baseline x 4 minutes.  Decision made to proceed to c/s delivery due to fetal intolerance of induction remote from delivery.  Consent on chart reviewed.  Anasthesia called to bedside and pt for Urgent c/s  In OR, FHR recovered to 130's.  s/p pLTCS of viable male infant - see delivery note       ZANE Yung

## 2022-12-13 NOTE — PROGRESS NOTE ADULT - ASSESSMENT
A/P: 33yo POD #1 s/p LTCS.  Patient is stable and doing well post-operatively.      #Labile bp  - Pt had one mild range pressure overnight   - AM HELLP labs    #Postpartum recovery from  section  - Continue regular diet.  - Increase ambulation.  - remove garland this AM  - PO pain medication with Tylenol, Motrin and Oxycodone PRN for pain control.    - POD #1 CBC this morning: H/H   - DVT prophylaxis with Heparin 5000u BID    Millie Broussard PGY1

## 2022-12-13 NOTE — OB PROVIDER DELIVERY SUMMARY - NSPROVIDERDELIVERYNOTE_OBGYN_ALL_OB_FT
ATTG note    s/p pLTCS of viable male infant Apgars 9/9, no nuchal cord  Delayed cord clamp as per peds  Massage and cord traction to deliver placenta - placenta to Path  Pitocin IV and Additional 10U in IV  Double-layer Hysterotomy repaired with Caprosyn suture  Grossly normal tubes and ovaries  Surgicel powder placed ppx  Subcut closure  [id number]    ZANE Yung

## 2022-12-14 ENCOUNTER — TRANSCRIPTION ENCOUNTER (OUTPATIENT)
Age: 32
End: 2022-12-14

## 2022-12-14 RX ORDER — ACETAMINOPHEN 500 MG
3 TABLET ORAL
Qty: 168 | Refills: 0
Start: 2022-12-14 | End: 2022-12-27

## 2022-12-14 RX ORDER — IBUPROFEN 200 MG
600 TABLET ORAL EVERY 6 HOURS
Refills: 0 | Status: DISCONTINUED | OUTPATIENT
Start: 2022-12-14 | End: 2022-12-16

## 2022-12-14 RX ORDER — IBUPROFEN 200 MG
1 TABLET ORAL
Qty: 56 | Refills: 0
Start: 2022-12-14 | End: 2022-12-27

## 2022-12-14 RX ADMIN — Medication 975 MILLIGRAM(S): at 23:11

## 2022-12-14 RX ADMIN — Medication 600 MILLIGRAM(S): at 13:12

## 2022-12-14 RX ADMIN — Medication 600 MILLIGRAM(S): at 20:19

## 2022-12-14 RX ADMIN — Medication 975 MILLIGRAM(S): at 16:54

## 2022-12-14 RX ADMIN — Medication 600 MILLIGRAM(S): at 19:49

## 2022-12-14 RX ADMIN — Medication 975 MILLIGRAM(S): at 10:02

## 2022-12-14 RX ADMIN — Medication 975 MILLIGRAM(S): at 23:41

## 2022-12-14 RX ADMIN — HEPARIN SODIUM 5000 UNIT(S): 5000 INJECTION INTRAVENOUS; SUBCUTANEOUS at 05:34

## 2022-12-14 RX ADMIN — Medication 975 MILLIGRAM(S): at 16:24

## 2022-12-14 RX ADMIN — Medication 30 MILLIGRAM(S): at 00:30

## 2022-12-14 RX ADMIN — Medication 975 MILLIGRAM(S): at 09:32

## 2022-12-14 RX ADMIN — HEPARIN SODIUM 5000 UNIT(S): 5000 INJECTION INTRAVENOUS; SUBCUTANEOUS at 22:14

## 2022-12-14 RX ADMIN — Medication 600 MILLIGRAM(S): at 06:15

## 2022-12-14 RX ADMIN — Medication 600 MILLIGRAM(S): at 12:42

## 2022-12-14 RX ADMIN — Medication 600 MILLIGRAM(S): at 05:35

## 2022-12-14 NOTE — DISCHARGE NOTE OB - CARE PROVIDER_API CALL
[facility],   [address]  [address]  [place] [address]  Phone: [phone]  Fax: (   )    -  Established Patient  Follow Up Time: 1 month

## 2022-12-14 NOTE — DISCHARGE NOTE OB - PROVIDER TOKENS
FREE:[LAST:[[facility]],PHONE:[phone]],FAX:[(   )    -],ADDRESS:[[address] [place] [address]],FOLLOWUP:[1 month],ESTABLISHEDPATIENT:[T]]

## 2022-12-14 NOTE — PROGRESS NOTE ADULT - SUBJECTIVE AND OBJECTIVE BOX
OB Postpartum Note:  Delivery    S: 31yo now POD #2 s/p pLTCS. Her pain is well controlled. She is tolerating a regular diet and is passing flatus. Voiding spontaneously and ambulating without difficulty. Denies Nausea/vomiting/CP/SOB/lightheadedness/dizziness/headache/epigastric pain/changes in vision.    O:   Vitals:  Vital Signs Last 24 Hrs  T(C): 36.6 (14 Dec 2022 06:04), Max: 36.8 (14 Dec 2022 01:10)  T(F): 97.9 (14 Dec 2022 06:04), Max: 98.2 (14 Dec 2022 01:10)  HR: 71 (14 Dec 2022 06:04) (66 - 85)  BP: 123/84 (14 Dec 2022 06:04) (119/76 - 123/84)  BP(mean): --  RR: 18 (14 Dec 2022 06:04) (18 - 18)  SpO2: 97% (14 Dec 2022 06:04) (96% - 99%)    Parameters below as of 14 Dec 2022 06:04  Patient On (Oxygen Delivery Method): room air        MEDICATIONS  (STANDING):  acetaminophen     Tablet .. 975 milliGRAM(s) Oral <User Schedule>  diphtheria/tetanus/pertussis (acellular) Vaccine (Adacel) 0.5 milliLiter(s) IntraMuscular once  heparin   Injectable 5000 Unit(s) SubCutaneous every 12 hours  ibuprofen  Tablet. 600 milliGRAM(s) Oral every 6 hours  lactated ringers. 1000 milliLiter(s) (125 mL/Hr) IV Continuous <Continuous>  morphine PF Epidural 2 milliGRAM(s) Epidural once  oxytocin Infusion 333.333 milliUNIT(s)/Min (1000 mL/Hr) IV Continuous <Continuous>  oxytocin Infusion 333.333 milliUNIT(s)/Min (1000 mL/Hr) IV Continuous <Continuous>  oxytocin Infusion. 2 milliUNIT(s)/Min (2 mL/Hr) IV Continuous <Continuous>    MEDICATIONS  (PRN):  diphenhydrAMINE 25 milliGRAM(s) Oral every 6 hours PRN Pruritus  diphenhydrAMINE 25 milliGRAM(s) Oral every 4 hours PRN Pruritus  lanolin Ointment 1 Application(s) Topical every 6 hours PRN Sore Nipples  magnesium hydroxide Suspension 30 milliLiter(s) Oral two times a day PRN Constipation  nalbuphine Injectable 2.5 milliGRAM(s) IV Push every 6 hours PRN Pruritus  naloxone Injectable 0.1 milliGRAM(s) IV Push every 3 minutes PRN For ANY of the following changes in patient status:  A. Breaths Per Minute LESS THAN 10, B. Oxygen saturation LESS THAN 90%, C. Sedation score of 6 for Stop After: 4 Times  ondansetron Injectable 4 milliGRAM(s) IV Push every 6 hours PRN Nausea  oxyCODONE    IR 5 milliGRAM(s) Oral every 3 hours PRN Moderate to Severe Pain (4-10)  oxyCODONE    IR 5 milliGRAM(s) Oral once PRN Moderate to Severe Pain (4-10)  simethicone 80 milliGRAM(s) Chew every 4 hours PRN Gas      Labs:  Blood type: O Positive  Rubella IgG: RPR: Negative                          12.0   27.21<H> >-----------< 197    (  @ 06:48 )             35.4                        12.8   15.46<H> >-----------< 234    (  @ 16:18 )             37.1    22 @ 06:48      133<L>  |  98  |  7   ----------------------------<  133<H>  4.5   |  21<L>  |  0.46<L>    22 @ 16:18      137  |  101  |  10  ----------------------------<  75  4.2   |  22  |  0.48<L>        Ca    9.0      13 Dec 2022 06:48  Ca    8.9      12 Dec 2022 16:18    TPro  6.8  /  Alb  3.2<L>  /  TBili  0.4  /  DBili  x   /  AST  30  /  ALT  18  /  AlkPhos  116  22 @ 06:48  TPro  7.1  /  Alb  3.5  /  TBili  0.3  /  DBili  x   /  AST  14  /  ALT  16  /  AlkPhos  117  22 @ 16:18          PE:  General: NAD, patient resting comfortably in bed  Abdomen: Mildly distended, appropriately tender. Fundus firm.  Incision: Clean, dry, intact.  : appropriate amount of lochia on pad  Extremities: SCDs in place, no erythema

## 2022-12-14 NOTE — DISCHARGE NOTE OB - MEDICATION SUMMARY - MEDICATIONS TO TAKE
I will START or STAY ON the medications listed below when I get home from the hospital:    blood pressure cuff  -- Apply on skin to affected area 3 times a day   -- Indication: For gestational hypertension    acetaminophen 325 mg oral tablet  -- 3 tab(s) by mouth every 6 hours   -- Indication: For pain    ibuprofen 600 mg oral tablet  -- 1 tab(s) by mouth every 6 hours  -- Indication: For pain    Prenatal Multivitamins with Folic Acid 1 mg oral capsule  -- 1 cap(s) by mouth once a day   -- Indication: For nutritional supplement

## 2022-12-14 NOTE — DISCHARGE NOTE OB - CARE PLAN
Principal Discharge DX:	Vaginal delivery  Assessment and plan of treatment:	32y   who experienced  at 32 weeks & 1 days. Labor course was uncomplicated & delivery was uncomplicated. Postpartum course was unremarkable. Patient was transferred to postpartum floor & monitored. Pt was voiding spontaneously with normal vital signs. Patient is medically optimized for discharge & instructed to follow up with [facility] in 6 weeks for postpartum care.   1

## 2022-12-14 NOTE — PROGRESS NOTE ADULT - ASSESSMENT
A/P: 31yo POD #2 s/p pLTCS.  Patient is stable and doing well post-operatively.      #Labile bp  - Pt had one mild range pressure overnight   - AM HELLP labs    #Postpartum recovery from  section,   - Continue regular diet.  - Increase ambulation.  - remove garland this AM  - PO pain medication with Tylenol, Motrin and Oxycodone PRN for pain control.    - POD #1 CBC this morning: H/H   - DVT prophylaxis with Heparin 5000u BID    Millie Broussard PGY1

## 2022-12-14 NOTE — DISCHARGE NOTE OB - PATIENT PORTAL LINK FT
You can access the FollowMyHealth Patient Portal offered by [facility] by registering at the following website: [email or web address]. By joining [facility]’s FollowMyHealth portal, you will also be able to view your health information using other applications (apps) compatible with our system.

## 2022-12-14 NOTE — DISCHARGE NOTE OB - HOSPITAL COURSE
Patient patrizia Balbuena underwent an uncomplicated primary LTCS for category 2 tracing. Pt was delivered at 32w 2/2 PPROM at 30w6d. . Patient’s postoperative course was unremarkable and she remained hemodynamically stable and afebrile throughout. Upon discharge on POD3, the patient is ambulating and voiding spontaneously, tolerating oral intake, pain was well controlled with oral medication, and vital signs were stable.

## 2022-12-14 NOTE — DISCHARGE NOTE OB - PLAN OF CARE
32y   who experienced  at 32 weeks & 1 days. Labor course was uncomplicated & delivery was uncomplicated. Postpartum course was unremarkable. Patient was transferred to postpartum floor & monitored. Pt was voiding spontaneously with normal vital signs. Patient is medically optimized for discharge & instructed to follow up with [facility] in 6 weeks for postpartum care.

## 2022-12-14 NOTE — DISCHARGE NOTE OB - NS MD DC FALL RISK RISK
For information on Fall & Injury Prevention, visit: [email or web address] OR  [email or web address] OR  https://www.cdc.gov/steadi/patient.html

## 2022-12-15 RX ADMIN — HEPARIN SODIUM 5000 UNIT(S): 5000 INJECTION INTRAVENOUS; SUBCUTANEOUS at 08:48

## 2022-12-15 RX ADMIN — Medication 975 MILLIGRAM(S): at 23:50

## 2022-12-15 RX ADMIN — HEPARIN SODIUM 5000 UNIT(S): 5000 INJECTION INTRAVENOUS; SUBCUTANEOUS at 20:24

## 2022-12-15 RX ADMIN — Medication 600 MILLIGRAM(S): at 04:42

## 2022-12-15 RX ADMIN — Medication 600 MILLIGRAM(S): at 08:48

## 2022-12-15 RX ADMIN — Medication 975 MILLIGRAM(S): at 17:40

## 2022-12-15 RX ADMIN — Medication 975 MILLIGRAM(S): at 23:16

## 2022-12-15 RX ADMIN — Medication 975 MILLIGRAM(S): at 11:37

## 2022-12-15 RX ADMIN — Medication 975 MILLIGRAM(S): at 06:30

## 2022-12-15 RX ADMIN — Medication 975 MILLIGRAM(S): at 11:42

## 2022-12-15 RX ADMIN — Medication 600 MILLIGRAM(S): at 21:00

## 2022-12-15 RX ADMIN — Medication 600 MILLIGRAM(S): at 14:35

## 2022-12-15 RX ADMIN — Medication 600 MILLIGRAM(S): at 09:06

## 2022-12-15 RX ADMIN — Medication 600 MILLIGRAM(S): at 14:43

## 2022-12-15 RX ADMIN — Medication 600 MILLIGRAM(S): at 20:23

## 2022-12-15 RX ADMIN — Medication 600 MILLIGRAM(S): at 04:12

## 2022-12-15 NOTE — PROGRESS NOTE ADULT - ASSESSMENT
A/P: 31yo POD #3 s/p pLTCS for category 2 tracing c/b gHTN.  Patient is stable and doing well post-operatively.      #Gestational hypertension  - bp well controlled overnight  - HELLP labs wnl  - not requiring antihypertensive medication at this time    #Postpartum recovery from  section,   - Continue regular diet.  - Increase ambulation.  - PO pain medication with Tylenol, Motrin and Oxycodone PRN for pain control.    - H/H   - DVT prophylaxis with Heparin 5000u BID    Millie Broussard PGY1

## 2022-12-15 NOTE — PROGRESS NOTE ADULT - SUBJECTIVE AND OBJECTIVE BOX
OB Postpartum Note:  Delivery    S: 31yo now POD #3 s/p pLTCS for category 2 tracing c/b gHTN. Her pain is well controlled. She is tolerating a regular diet and passing flatus. Voiding spontaneously and ambulating without difficulty. Denies Nausea/vomiting/CP/SOB/lightheadedness/dizziness/headache/epigastric pain/changes in vision.    O:   Vitals:  Vital Signs Last 24 Hrs  T(C): 36.7 (15 Dec 2022 05:42), Max: 36.8 (14 Dec 2022 12:39)  T(F): 98.1 (15 Dec 2022 05:42), Max: 98.2 (14 Dec 2022 12:39)  HR: 76 (15 Dec 2022 05:42) (75 - 82)  BP: 127/87 (15 Dec 2022 05:42) (127/87 - 132/84)  BP(mean): --  RR: 18 (15 Dec 2022 05:42) (18 - 18)  SpO2: 98% (15 Dec 2022 05:42) (97% - 99%)    Parameters below as of 15 Dec 2022 05:42  Patient On (Oxygen Delivery Method): room air        MEDICATIONS  (STANDING):  acetaminophen     Tablet .. 975 milliGRAM(s) Oral <User Schedule>  diphtheria/tetanus/pertussis (acellular) Vaccine (Adacel) 0.5 milliLiter(s) IntraMuscular once  heparin   Injectable 5000 Unit(s) SubCutaneous every 12 hours  ibuprofen  Tablet. 600 milliGRAM(s) Oral every 6 hours  lactated ringers. 1000 milliLiter(s) (125 mL/Hr) IV Continuous <Continuous>  measles/mumps/rubella Vaccine 0.5 milliLiter(s) SubCutaneous once  oxytocin Infusion 333.333 milliUNIT(s)/Min (1000 mL/Hr) IV Continuous <Continuous>  oxytocin Infusion 333.333 milliUNIT(s)/Min (1000 mL/Hr) IV Continuous <Continuous>  oxytocin Infusion. 2 milliUNIT(s)/Min (2 mL/Hr) IV Continuous <Continuous>    MEDICATIONS  (PRN):  diphenhydrAMINE 25 milliGRAM(s) Oral every 6 hours PRN Pruritus  lanolin Ointment 1 Application(s) Topical every 6 hours PRN Sore Nipples  magnesium hydroxide Suspension 30 milliLiter(s) Oral two times a day PRN Constipation  oxyCODONE    IR 5 milliGRAM(s) Oral every 3 hours PRN Moderate to Severe Pain (4-10)  oxyCODONE    IR 5 milliGRAM(s) Oral once PRN Moderate to Severe Pain (4-10)  simethicone 80 milliGRAM(s) Chew every 4 hours PRN Gas      Labs:  Blood type: O Positive  Rubella IgG: RPR: Negative                          12.0   27.21<H> >-----------< 197    (  @ 06:48 )             35.4                        12.8   15.46<H> >-----------< 234    (  @ 16:18 )             37.1    22 @ 06:48      133<L>  |  98  |  7   ----------------------------<  133<H>  4.5   |  21<L>  |  0.46<L>    22 @ 16:18      137  |  101  |  10  ----------------------------<  75  4.2   |  22  |  0.48<L>        Ca    9.0      13 Dec 2022 06:48  Ca    8.9      12 Dec 2022 16:18    TPro  6.8  /  Alb  3.2<L>  /  TBili  0.4  /  DBili  x   /  AST  30  /  ALT  18  /  AlkPhos  116  22 @ 06:48  TPro  7.1  /  Alb  3.5  /  TBili  0.3  /  DBili  x   /  AST  14  /  ALT  16  /  AlkPhos  117  22 @ 16:18          PE:  General: NAD, patient resting comfortably in bed  Abdomen: Mildly distended, appropriately tender. Fundus firm.  Incision: Clean, dry, intact.  : appropriate amount of lochia on pad  Extremities: SCDs in place, no erythema

## 2022-12-16 VITALS
HEART RATE: 88 BPM | TEMPERATURE: 99 F | RESPIRATION RATE: 16 BRPM | OXYGEN SATURATION: 96 % | SYSTOLIC BLOOD PRESSURE: 111 MMHG | DIASTOLIC BLOOD PRESSURE: 74 MMHG

## 2022-12-16 PROCEDURE — 83615 LACTATE (LD) (LDH) ENZYME: CPT

## 2022-12-16 PROCEDURE — 86780 TREPONEMA PALLIDUM: CPT

## 2022-12-16 PROCEDURE — 82570 ASSAY OF URINE CREATININE: CPT

## 2022-12-16 PROCEDURE — 86901 BLOOD TYPING SEROLOGIC RH(D): CPT

## 2022-12-16 PROCEDURE — 86850 RBC ANTIBODY SCREEN: CPT

## 2022-12-16 PROCEDURE — 84156 ASSAY OF PROTEIN URINE: CPT

## 2022-12-16 PROCEDURE — 90715 TDAP VACCINE 7 YRS/> IM: CPT

## 2022-12-16 PROCEDURE — 85730 THROMBOPLASTIN TIME PARTIAL: CPT

## 2022-12-16 PROCEDURE — 87491 CHLMYD TRACH DNA AMP PROBE: CPT

## 2022-12-16 PROCEDURE — 93005 ELECTROCARDIOGRAM TRACING: CPT

## 2022-12-16 PROCEDURE — G0463: CPT

## 2022-12-16 PROCEDURE — 85610 PROTHROMBIN TIME: CPT

## 2022-12-16 PROCEDURE — 36415 COLL VENOUS BLD VENIPUNCTURE: CPT

## 2022-12-16 PROCEDURE — 86769 SARS-COV-2 COVID-19 ANTIBODY: CPT

## 2022-12-16 PROCEDURE — 87653 STREP B DNA AMP PROBE: CPT

## 2022-12-16 PROCEDURE — 59025 FETAL NON-STRESS TEST: CPT

## 2022-12-16 PROCEDURE — 83735 ASSAY OF MAGNESIUM: CPT

## 2022-12-16 PROCEDURE — 81003 URINALYSIS AUTO W/O SCOPE: CPT

## 2022-12-16 PROCEDURE — 87635 SARS-COV-2 COVID-19 AMP PRB: CPT

## 2022-12-16 PROCEDURE — 85025 COMPLETE CBC W/AUTO DIFF WBC: CPT

## 2022-12-16 PROCEDURE — U0003: CPT

## 2022-12-16 PROCEDURE — 85384 FIBRINOGEN ACTIVITY: CPT

## 2022-12-16 PROCEDURE — 84550 ASSAY OF BLOOD/URIC ACID: CPT

## 2022-12-16 PROCEDURE — 87591 N.GONORRHOEAE DNA AMP PROB: CPT

## 2022-12-16 PROCEDURE — U0005: CPT

## 2022-12-16 PROCEDURE — 90707 MMR VACCINE SC: CPT

## 2022-12-16 PROCEDURE — 88307 TISSUE EXAM BY PATHOLOGIST: CPT

## 2022-12-16 PROCEDURE — 80053 COMPREHEN METABOLIC PANEL: CPT

## 2022-12-16 PROCEDURE — 86900 BLOOD TYPING SEROLOGIC ABO: CPT

## 2022-12-16 RX ADMIN — Medication 975 MILLIGRAM(S): at 12:45

## 2022-12-16 RX ADMIN — Medication 600 MILLIGRAM(S): at 09:04

## 2022-12-16 RX ADMIN — Medication 600 MILLIGRAM(S): at 14:46

## 2022-12-16 RX ADMIN — Medication 975 MILLIGRAM(S): at 06:02

## 2022-12-16 RX ADMIN — Medication 975 MILLIGRAM(S): at 12:43

## 2022-12-16 RX ADMIN — Medication 975 MILLIGRAM(S): at 17:31

## 2022-12-16 RX ADMIN — Medication 600 MILLIGRAM(S): at 09:06

## 2022-12-16 RX ADMIN — Medication 975 MILLIGRAM(S): at 05:18

## 2022-12-16 RX ADMIN — Medication 0.5 MILLILITER(S): at 14:45

## 2022-12-16 NOTE — PROGRESS NOTE ADULT - ASSESSMENT
A/P: 31yo POD #4 s/p pLTCS for category 2 tracing c/b gHTN.  Patient is stable and doing well post-operatively.      #Gestational hypertension  - bp well controlled overnight  - HELLP labs wnl  - not requiring antihypertensive medication at this time    #Postpartum recovery from  section,   - Continue regular diet.  - Increase ambulation.  - PO pain medication with Tylenol, Motrin and Oxycodone PRN for pain control.    - H/H   - DVT prophylaxis with Heparin 5000u BID    Millie Broussard PGY1

## 2022-12-16 NOTE — PROGRESS NOTE ADULT - SUBJECTIVE AND OBJECTIVE BOX
OB Postpartum Note:  Delivery    S: 33yo POD #4 s/p pLTCS for category 2 tracing c/b gHTN. Her pain is well controlled. She is tolerating a regular diet and passing flatus. Voiding spontaneously and ambulating without difficulty. Denies Nausea/vomiting/CP/SOB/lightheadedness/dizziness/headache/epigastric pain/changes in vision.    O:   Vitals:  Vital Signs Last 24 Hrs  T(C): 36.9 (16 Dec 2022 05:44), Max: 36.9 (16 Dec 2022 05:44)  T(F): 98.4 (16 Dec 2022 05:44), Max: 98.4 (16 Dec 2022 05:44)  HR: 73 (16 Dec 2022 05:44) (73 - 92)  BP: 135/88 (16 Dec 2022 05:44) (132/87 - 135/88)  BP(mean): --  RR: 18 (16 Dec 2022 05:44) (18 - 18)  SpO2: 98% (16 Dec 2022 05:44) (95% - 98%)    Parameters below as of 16 Dec 2022 05:44  Patient On (Oxygen Delivery Method): room air        MEDICATIONS  (STANDING):  acetaminophen     Tablet .. 975 milliGRAM(s) Oral <User Schedule>  diphtheria/tetanus/pertussis (acellular) Vaccine (Adacel) 0.5 milliLiter(s) IntraMuscular once  heparin   Injectable 5000 Unit(s) SubCutaneous every 12 hours  ibuprofen  Tablet. 600 milliGRAM(s) Oral every 6 hours  lactated ringers. 1000 milliLiter(s) (125 mL/Hr) IV Continuous <Continuous>  measles/mumps/rubella Vaccine 0.5 milliLiter(s) SubCutaneous once  oxytocin Infusion 333.333 milliUNIT(s)/Min (1000 mL/Hr) IV Continuous <Continuous>  oxytocin Infusion 333.333 milliUNIT(s)/Min (1000 mL/Hr) IV Continuous <Continuous>  oxytocin Infusion. 2 milliUNIT(s)/Min (2 mL/Hr) IV Continuous <Continuous>    MEDICATIONS  (PRN):  diphenhydrAMINE 25 milliGRAM(s) Oral every 6 hours PRN Pruritus  lanolin Ointment 1 Application(s) Topical every 6 hours PRN Sore Nipples  magnesium hydroxide Suspension 30 milliLiter(s) Oral two times a day PRN Constipation  oxyCODONE    IR 5 milliGRAM(s) Oral every 3 hours PRN Moderate to Severe Pain (4-10)  oxyCODONE    IR 5 milliGRAM(s) Oral once PRN Moderate to Severe Pain (4-10)  simethicone 80 milliGRAM(s) Chew every 4 hours PRN Gas      Labs:  Blood type: O Positive  Rubella IgG: RPR: Negative            PE:  General: NAD, patient resting comfortably in bed  Abdomen: Mildly distended, appropriately tender. Fundus firm.  Incision: Clean, dry, intact.  : appropriate amount of lochia on pad  Extremities: SCDs in place, no erythema

## 2022-12-22 LAB — SURGICAL PATHOLOGY STUDY: SIGNIFICANT CHANGE UP

## 2022-12-27 ENCOUNTER — OUTPATIENT (OUTPATIENT)
Dept: OUTPATIENT SERVICES | Facility: HOSPITAL | Age: 32
LOS: 1 days | End: 2022-12-27
Payer: MEDICAID

## 2022-12-27 ENCOUNTER — APPOINTMENT (OUTPATIENT)
Dept: OBGYN | Facility: CLINIC | Age: 32
End: 2022-12-27
Payer: MEDICAID

## 2022-12-27 VITALS — BODY MASS INDEX: 23.63 KG/M2 | DIASTOLIC BLOOD PRESSURE: 82 MMHG | WEIGHT: 121 LBS | SYSTOLIC BLOOD PRESSURE: 120 MMHG

## 2022-12-27 DIAGNOSIS — Z98.890 OTHER SPECIFIED POSTPROCEDURAL STATES: Chronic | ICD-10-CM

## 2022-12-27 DIAGNOSIS — Z90.49 ACQUIRED ABSENCE OF OTHER SPECIFIED PARTS OF DIGESTIVE TRACT: Chronic | ICD-10-CM

## 2022-12-27 PROCEDURE — G0463: CPT

## 2022-12-27 PROCEDURE — 99214 OFFICE O/P EST MOD 30 MIN: CPT | Mod: 1L,GE

## 2023-01-19 NOTE — HISTORY OF PRESENT ILLNESS
[Postpartum Follow Up] : postpartum follow up [Complications:___] : complications include: [unfilled] [PTL/PROM] : PTL/PROM [Delivery Date: ___] : on [unfilled] [Primary C/S] : delivered by  section [Boy] : baby is a boy [NICU Admission] : NICU admission [Hospital] : is currently in the hospital [Breastfeeding] : currently nursing [Intended Contraception] : Intended Contraception: [None] : The patient is currently asymptomatic [Clean/Dry/Intact] : clean, dry and intact [Healed] : healed [Back to Normal] : is back to normal in size [Mild] : mild vaginal bleeding [Doing Well] : is doing well [No Sign of Infection] : is showing no signs of infection [Excellent Pain Control] : has excellent pain control [Resumed Menses] : has not resumed her menses [Resumed [place]on] : has not resumed intercourse [Abdominal Pain] : no abdominal pain [Back Pain] : no back pain [Chest Pain] : no chest pain [Cracked Nipples] : no cracked nipples [Heavy Bleeding] : no heavy bleeding [Incisional Drainage] : no incisional drainage [Leg Pain] : no leg pain [Fatigue] : no fatigue [Fever] : no fever [Headache] : no headache [Nausea] : no nausea [Vomiting] : no vomiting [Erythema] : not erythematous [Swelling] : not swollen [de-identified] :  birth @32w [de-identified] : Dermabond Prineo tape removed  [FreeTextEntry1] : 31 yo  presents for postpartum visit s/p pLTCS @32w. Pt w/ hx of HTN. Patient is doing well post-partum. Prineo dressng removed at this visit. Pt to f/u in 4 weeks. Will discuss contraceptive planning at that time.\par \par Patient seen and evaluated w/ Dr. Rojas, Attending Physician \par \par Cami Hodgson, PGY1\par

## 2023-01-24 ENCOUNTER — APPOINTMENT (OUTPATIENT)
Dept: OBGYN | Facility: CLINIC | Age: 33
End: 2023-01-24
Payer: MEDICAID

## 2023-01-24 ENCOUNTER — LABORATORY RESULT (OUTPATIENT)
Age: 33
End: 2023-01-24

## 2023-01-24 ENCOUNTER — OUTPATIENT (OUTPATIENT)
Dept: OUTPATIENT SERVICES | Facility: HOSPITAL | Age: 33
LOS: 1 days | End: 2023-01-24
Payer: MEDICAID

## 2023-01-24 ENCOUNTER — NON-APPOINTMENT (OUTPATIENT)
Age: 33
End: 2023-01-24

## 2023-01-24 VITALS
DIASTOLIC BLOOD PRESSURE: 60 MMHG | BODY MASS INDEX: 22.87 KG/M2 | WEIGHT: 117.13 LBS | SYSTOLIC BLOOD PRESSURE: 100 MMHG

## 2023-01-24 DIAGNOSIS — Z98.890 OTHER SPECIFIED POSTPROCEDURAL STATES: Chronic | ICD-10-CM

## 2023-01-24 DIAGNOSIS — Z00.00 ENCOUNTER FOR GENERAL ADULT MEDICAL EXAMINATION W/OUT ABNORMAL FINDINGS: ICD-10-CM

## 2023-01-24 DIAGNOSIS — B96.89 ACUTE VAGINITIS: ICD-10-CM

## 2023-01-24 DIAGNOSIS — N76.0 ACUTE VAGINITIS: ICD-10-CM

## 2023-01-24 DIAGNOSIS — Z90.49 ACQUIRED ABSENCE OF OTHER SPECIFIED PARTS OF DIGESTIVE TRACT: Chronic | ICD-10-CM

## 2023-01-24 DIAGNOSIS — Z30.9 ENCOUNTER FOR CONTRACEPTIVE MANAGEMENT, UNSPECIFIED: ICD-10-CM

## 2023-01-24 PROCEDURE — 87800 DETECT AGNT MULT DNA DIREC: CPT

## 2023-01-24 PROCEDURE — 99213 OFFICE O/P EST LOW 20 MIN: CPT | Mod: TH,GC

## 2023-01-24 PROCEDURE — G0463: CPT

## 2023-01-24 RX ORDER — LEVONORGESTREL AND ETHINYL ESTRADIOL 0.1-0.02MG
0.1-2 KIT ORAL
Qty: 3 | Refills: 3 | Status: ACTIVE | COMMUNITY
Start: 2023-01-24 | End: 1900-01-01

## 2023-01-25 LAB
CANDIDA AB TITR SER: SIGNIFICANT CHANGE UP
G VAGINALIS DNA SPEC QL NAA+PROBE: SIGNIFICANT CHANGE UP
T VAGINALIS SPEC QL WET PREP: SIGNIFICANT CHANGE UP

## 2023-01-27 DIAGNOSIS — Z30.9 ENCOUNTER FOR CONTRACEPTIVE MANAGEMENT, UNSPECIFIED: ICD-10-CM

## 2023-01-27 NOTE — HISTORY OF PRESENT ILLNESS
[Postpartum Follow Up] : postpartum follow up [Complications:___] : complications include: [unfilled] [GA Delivery ___wks] : [unfilled] weeks GA at delivery [Delivery Date: ___] : on [unfilled] [Primary C/S] : delivered by  section [Male] : Delivery History: baby boy [Breastfeeding] : currently nursing [Intended Contraception] : Intended Contraception: [Oral Contraceptives] : oral contraceptives [Vaginal Discharge] : vaginal discharge [Clean/Dry/Intact] : clean, dry and intact [Healed] : healed [Mild] : mild vaginal bleeding [Normal] : the vagina was normal [Examination Of The Breasts] : breasts are normal [Doing Well] : is doing well [Excellent Pain Control] : has excellent pain control [None] : None [Abdominal Pain] : no abdominal pain [Breast Pain] : no breast pain [Chest Pain] : no chest pain [Cracked Nipples] : no cracked nipples [Heavy Bleeding] : no heavy bleeding [Incisional Drainage] : no incisional drainage [Incisional Pain] : no incisional pain [Irregular Bleeding] : no irregular bleeding [Leg Pain] : no leg pain [Shortness of Breath] : no shortness of breath [Chills] : no chills [Fatigue] : no fatigue [Dysuria] : no dysuria [Fever] : no fever [Headache] : no headache [Nausea] : no nausea [Vomiting] : no vomiting [Erythema] : not erythematous [Swelling] : not swollen [Dehiscence] : not dehisced [de-identified] : 33 yo  presents for 6 week postpartum visit s/p pLTCS @32w. [de-identified] : Patient requesting OCP's. Discussed the risks and benefits, namely the possible decrease in milk production (unlikely at this time.) Patient verbalized understanding. Ora sent. RTC in 6 months for annual GYN exam.

## 2023-06-12 RX ORDER — LEVONORGESTREL AND ETHINYL ESTRADIOL 0.1-0.02MG
0.1-2 KIT ORAL DAILY
Qty: 1 | Refills: 11 | Status: ACTIVE | COMMUNITY
Start: 2023-06-12 | End: 1900-01-01

## 2023-09-08 NOTE — OB RN DELIVERY SUMMARY - NS_[email or web address]_OBGYN_ALL_OB
TODAY'S VISIT:  You were seen today for ulcers  - you tested positive for trichomoniasis and UTI  - you were treated with antibiotics for chlamydia, gonorrhea, trichomoniasis, and urinary infection  - your other labs are still pending, we do not know for sure if you have chlamydia and gonorrhea but if you do test positive you have already been treated appropriately  - your HIV, syphilis, and hepatitis labs are still pending  - it will be very important for you to follow-up in clinic    FOLLOW-UP:  Please make an appointment to follow up with:  - A [facility] was placed for [facility] (phone: [phone]). Please call them if you do not hear from them in 1-2 days for an appointment  - below are listed several walk in clinics through [facility] that are available to you. They will also be able to access your results and provide ongoing treatment and resources to you       PRESCRIPTIONS / MEDICATIONS:  - Doxycycline. Take as prescribed for full duration of medication even if improved prior to end of antibiotics. This medication can cause GI upset, to help with GI symptoms, take this medication with food, do not lay down for 30 minutes after taking. Take a probiotic or extra yogurt while using this medication. Use sunscreen while taking this medication.   - Acyclovir - this is to treat the inflammation of the lesions in your mouth, genitals, and feet. Take 3 times daily.  - Magic mouthwash - swish and swallow or spit to help with pain from mouth sores    OTHER INSTRUCTIONS:  - gargle warm salt water. Keep feet open to air to let lesions dry out. Change underwear often throughout the day.   - it is very important that you follow-up in clinic even if symptoms are improving.    RETURN TO THE EMERGENCY DEPARTMENT  You may of course return to the Emergency Department at any time for any new or worsening symptoms or any concerns.    [facility] for the [facility] also has several walk in clinics  for free medical care.   [facility]  [address] [address]  Clinic hours  Monday, 8 a.m. to 4 p.m.  Tuesday, 8 a.m. to noon  Wednesday, 8 a.m. to 4 p.m.  Thursday, 8 a.m. to 4 p.m.  Friday, 8 a.m. to noon    [facility] [place] [facility]  [address] [place] [address]  Contact  [phone]  Clinic hours  Monday, 8 a.m. to 4 p.m.  Tuesday, 1 to 4 p.m.  Wednesday, 8 a.m. to 4 p.m.  Thursday, 8 a.m. to 4 p.m.  Friday, 8 a.m. to noon    [facility]  [address] [place] [address]  Contact  [phone]  Clinic hours  Monday, 1 to 8 p.m.  Tuesday, 8 a.m. to 4 p.m.  Wednesday, 1 to 8 p.m.  Thursday, 8 a.m. to 4 p.m    Drop-in center for adults only  [facility]  Location  Map: [facility]  [address] [address]  Contact  [phone]  Clinic hours  Thursday, 8 a.m. to noon    Drop-in center for youth  [facility]  Map: [facility]  [address] [address]  Contact  [phone]  Clinic hours  Tuesday, 9 a.m. to 4 p.m.  Thursday, 9 a.m. to 4 p.m.    Was not done

## 2023-10-17 NOTE — PROGRESS NOTE ADULT - ATTENDING COMMENTS
PIP= early 2 hour GTT is normal. Repeat 2 hour GTT at 26 weeks. She is anemic and was started on iron yesterday.   
Obstetrical  8am-6pm:  Patient seen and examined by me. Agree with above resident note. Incision clean, dry and intact.  Aiden AGUDELO
MFM Attending Addendum    The patient was seen and evaluated on AM rounds with MFM Fellow Dr. Phillips.  I agree with the assessment and plan as listed.  Stable PPROM at 31 3/7 weeks, on day 5/7 latency antibiotics, s/p ACS.  GBS negative.  No s/sxs of chorio.  EKG for low heartrate. Continue expectant management at this time.    MALA Looney MD
MFM Attending Addendum    The patient was seen and evaluated on AM rounds with [facility] Resident Dr. Feliciano, and M Fellow Dr. Souza.  I agree with the assessment and plan as listed.  Stable PPROM at 31 5/7 weeks, on day 7/7 latency antibiotics, s/p ACS, GHTN without severe features.  GBS negative.  No s/sxs of chorio.  Newly diagnosed FGR on 12/9/22, with elevated, but not absent UA Dopplers.  Discussed with patient - Continue expectant management at this time, aiming for delivery at 34 weeks (sooner if severe PEC, chorio, NRFHT, or absent/reversed end diastolic flow on UA Dopplers).    MALA Looney MD
Obstetrical  8am-6pm:  Patient seen and examined by me. Agree with above resident note. Incision clean, dry and intact.  Aiden AGUDELO
MFM Attending Addendum    The patient was seen and evaluated on AM rounds with M Resident Dr. Feliciano.  I agree with the assessment and plan as listed.  Stable PPROM at 31 6/7 weeks, now s/p latency antibiotics, s/p ACS, GHTN without severe features.  GBS negative.  No s/sxs of chorio.  Newly diagnosed FGR on 12/9/22, with elevated, but not absent UA Dopplers.  Overall fetal and maternal status reassuring. Discussed with patient - Continue expectant management at this time, aiming for delivery at 34 weeks (sooner if severe PEC, chorio, NRFHT, or absent/reversed end diastolic flow on UA Dopplers).  Will repeat sono tomorrow.    MALA Looney MD
M Attending Addendum    The patient was seen and evaluated on AM rounds with [facility] Resident Dr. Feliciano, and [facility] Fellow Dr. Phillips.  I agree with the assessment and plan as listed.  Stable PPROM at 31 4/7 weeks, on day 6/7 latency antibiotics, s/p ACS.  GBS negative.  No s/sxs of chorio.  EKG wnl. Continue expectant management at this time.    MALA Looney MD .

## 2024-06-13 ENCOUNTER — RX RENEWAL (OUTPATIENT)
Age: 34
End: 2024-06-13

## 2024-06-13 RX ORDER — LEVONORGESTREL AND ETHINYL ESTRADIOL 0.1-0.02MG
0.1-2 KIT ORAL
Qty: 28 | Refills: 11 | Status: ACTIVE | COMMUNITY
Start: 2024-06-13 | End: 1900-01-01

## 2025-05-21 ENCOUNTER — RX RENEWAL (OUTPATIENT)
Age: 35
End: 2025-05-21